# Patient Record
Sex: FEMALE | Race: WHITE | NOT HISPANIC OR LATINO | Employment: UNEMPLOYED | URBAN - METROPOLITAN AREA
[De-identification: names, ages, dates, MRNs, and addresses within clinical notes are randomized per-mention and may not be internally consistent; named-entity substitution may affect disease eponyms.]

---

## 2018-11-07 ENCOUNTER — HOSPITAL ENCOUNTER (OUTPATIENT)
Dept: RADIOLOGY | Facility: HOSPITAL | Age: 68
Discharge: HOME | End: 2018-11-07
Payer: MEDICAID

## 2018-11-07 DIAGNOSIS — J44.9 COPD (CHRONIC OBSTRUCTIVE PULMONARY DISEASE): ICD-10-CM

## 2018-11-07 DIAGNOSIS — J44.9 COPD (CHRONIC OBSTRUCTIVE PULMONARY DISEASE): Primary | ICD-10-CM

## 2018-11-07 DIAGNOSIS — Z12.39 SCREENING BREAST EXAMINATION: Primary | ICD-10-CM

## 2018-11-07 DIAGNOSIS — Z78.0 POSTMENOPAUSAL STATE: Primary | ICD-10-CM

## 2018-11-07 PROCEDURE — 71046 X-RAY EXAM CHEST 2 VIEWS: CPT | Mod: 26,,, | Performed by: RADIOLOGY

## 2018-11-07 PROCEDURE — 71046 X-RAY EXAM CHEST 2 VIEWS: CPT | Mod: TC,FY

## 2018-11-13 ENCOUNTER — TELEPHONE (OUTPATIENT)
Dept: NEUROLOGY | Facility: HOSPITAL | Age: 68
End: 2018-11-13

## 2018-11-13 NOTE — TELEPHONE ENCOUNTER
Patient returned call and stated she would call back when she was ready to schedule an appointment.

## 2018-12-11 ENCOUNTER — HOSPITAL ENCOUNTER (OUTPATIENT)
Dept: RADIOLOGY | Facility: HOSPITAL | Age: 68
Discharge: HOME | End: 2018-12-11
Payer: COMMERCIAL

## 2018-12-11 DIAGNOSIS — Z78.0 POSTMENOPAUSAL STATE: ICD-10-CM

## 2018-12-11 DIAGNOSIS — Z12.39 SCREENING BREAST EXAMINATION: ICD-10-CM

## 2018-12-11 PROCEDURE — 77067 SCR MAMMO BI INCL CAD: CPT | Mod: 26,,, | Performed by: RADIOLOGY

## 2018-12-11 PROCEDURE — 77063 BREAST TOMOSYNTHESIS BI: CPT | Mod: TC

## 2018-12-11 PROCEDURE — 77081 DXA BONE DENSITY APPENDICULR: CPT | Mod: 26,,, | Performed by: RADIOLOGY

## 2018-12-11 PROCEDURE — 77081 DXA BONE DENSITY APPENDICULR: CPT | Mod: TC

## 2018-12-11 PROCEDURE — 77063 BREAST TOMOSYNTHESIS BI: CPT | Mod: 26,,, | Performed by: RADIOLOGY

## 2018-12-13 ENCOUNTER — TELEPHONE (OUTPATIENT)
Dept: RADIOLOGY | Facility: HOSPITAL | Age: 68
End: 2018-12-13

## 2019-03-01 ENCOUNTER — TELEPHONE (OUTPATIENT)
Dept: OBSTETRICS AND GYNECOLOGY | Facility: CLINIC | Age: 69
End: 2019-03-01

## 2019-03-01 NOTE — TELEPHONE ENCOUNTER
Contacted pt regarding referral received on fax from pcp office to schedule an appt for abnormal paps. No answer, scheduled pt for ist available ./22 at 11am, lvm.

## 2019-03-01 NOTE — TELEPHONE ENCOUNTER
----- Message from Ct Sparks sent at 3/1/2019  9:53 AM CST -----  No. [phone]    Patient returned your call.

## 2019-03-20 ENCOUNTER — HOSPITAL ENCOUNTER (OUTPATIENT)
Dept: RADIOLOGY | Facility: HOSPITAL | Age: 69
Discharge: HOME | End: 2019-03-20
Attending: INTERNAL MEDICINE
Payer: MEDICARE

## 2019-03-20 DIAGNOSIS — M25.512 ACUTE PAIN OF BOTH SHOULDERS: ICD-10-CM

## 2019-03-20 DIAGNOSIS — M54.2 NECK PAIN: ICD-10-CM

## 2019-03-20 DIAGNOSIS — M25.511 ACUTE PAIN OF BOTH SHOULDERS: ICD-10-CM

## 2019-03-20 DIAGNOSIS — M54.2 NECK PAIN: Primary | ICD-10-CM

## 2019-03-20 PROCEDURE — 70360 X-RAY EXAM OF NECK: CPT | Mod: 26,,, | Performed by: RADIOLOGY

## 2019-03-20 PROCEDURE — 73030 X-RAY EXAM OF SHOULDER: CPT | Mod: TC,50,FY

## 2019-03-20 PROCEDURE — 73030 X-RAY EXAM OF SHOULDER: CPT | Mod: 26,50,, | Performed by: RADIOLOGY

## 2019-03-20 PROCEDURE — 70360 XR NECK SOFT TISSUE: ICD-10-PCS | Mod: 26,,, | Performed by: RADIOLOGY

## 2019-03-20 PROCEDURE — 70360 X-RAY EXAM OF NECK: CPT | Mod: TC,FY

## 2019-03-20 PROCEDURE — 73030 XR SHOULDER COMPLETE 2 OR MORE VIEWS BILATERAL: ICD-10-PCS | Mod: 26,50,, | Performed by: RADIOLOGY

## 2019-03-22 ENCOUNTER — OFFICE VISIT (OUTPATIENT)
Dept: OBSTETRICS AND GYNECOLOGY | Facility: CLINIC | Age: 69
End: 2019-03-22
Payer: MEDICARE

## 2019-03-22 VITALS
HEIGHT: 63 IN | SYSTOLIC BLOOD PRESSURE: 118 MMHG | WEIGHT: 147.5 LBS | DIASTOLIC BLOOD PRESSURE: 66 MMHG | BODY MASS INDEX: 26.13 KG/M2

## 2019-03-22 DIAGNOSIS — N81.4 CYSTOCELE WITH PROLAPSE: ICD-10-CM

## 2019-03-22 DIAGNOSIS — Z12.4 SCREENING FOR CERVICAL CANCER: Primary | ICD-10-CM

## 2019-03-22 DIAGNOSIS — N39.3 URINARY, INCONTINENCE, STRESS FEMALE: ICD-10-CM

## 2019-03-22 PROCEDURE — 99203 PR OFFICE/OUTPT VISIT, NEW, LEVL III, 30-44 MIN: ICD-10-PCS | Mod: S$GLB,,, | Performed by: OBSTETRICS & GYNECOLOGY

## 2019-03-22 PROCEDURE — 1101F PR PT FALLS ASSESS DOC 0-1 FALLS W/OUT INJ PAST YR: ICD-10-PCS | Mod: CPTII,S$GLB,, | Performed by: OBSTETRICS & GYNECOLOGY

## 2019-03-22 PROCEDURE — 1101F PT FALLS ASSESS-DOCD LE1/YR: CPT | Mod: CPTII,S$GLB,, | Performed by: OBSTETRICS & GYNECOLOGY

## 2019-03-22 PROCEDURE — 88175 CYTOPATH C/V AUTO FLUID REDO: CPT

## 2019-03-22 PROCEDURE — 99203 OFFICE O/P NEW LOW 30 MIN: CPT | Mod: S$GLB,,, | Performed by: OBSTETRICS & GYNECOLOGY

## 2019-03-22 PROCEDURE — 87086 URINE CULTURE/COLONY COUNT: CPT

## 2019-03-22 PROCEDURE — 99999 PR PBB SHADOW E&M-EST. PATIENT-LVL III: CPT | Mod: PBBFAC,,, | Performed by: OBSTETRICS & GYNECOLOGY

## 2019-03-22 PROCEDURE — 99999 PR PBB SHADOW E&M-EST. PATIENT-LVL III: ICD-10-PCS | Mod: PBBFAC,,, | Performed by: OBSTETRICS & GYNECOLOGY

## 2019-03-22 PROCEDURE — 87624 HPV HI-RISK TYP POOLED RSLT: CPT

## 2019-03-22 RX ORDER — CALCIUM CARBONATE 600 MG
1 TABLET ORAL DAILY
Refills: 3 | COMMUNITY
Start: 2019-02-27

## 2019-03-22 RX ORDER — METFORMIN HYDROCHLORIDE 500 MG/1
500 TABLET ORAL
Refills: 3 | COMMUNITY
Start: 2019-01-20

## 2019-03-22 RX ORDER — ERGOCALCIFEROL 1.25 MG/1
CAPSULE ORAL
Refills: 3 | COMMUNITY
Start: 2019-03-11

## 2019-03-22 RX ORDER — ASPIRIN 81 MG/1
81 TABLET ORAL DAILY
Refills: 2 | COMMUNITY
Start: 2018-12-19 | End: 2024-01-08

## 2019-03-22 RX ORDER — FENOFIBRATE 160 MG/1
TABLET ORAL
Refills: 0 | COMMUNITY
Start: 2018-12-19 | End: 2024-01-08

## 2019-03-22 NOTE — LETTER
March 22, 2019      Kathy Barth MD  [address]  [address]  [place] [address]           [place] [facility]  [address], [address]  [address]  [place] [address]  Phone: [phone]          Patient: Nia Ames   MR Number: [id number]   YOB: 1950   Date of Visit: 3/22/2019       Dear Dr. Kathy Barth:    Thank you for referring Nia Ames to me for evaluation. Attached you will find relevant portions of my assessment and plan of care.    If you have questions, please do not hesitate to call me. I look forward to following Nia Ames along with you.    Sincerely,    Sapphire Jones MD    Enclosure  CC:  No Recipients    If you would like to receive this communication electronically, please contact [email or web address] or [phone] to request more information on [employer] access.    For providers and/or their staff who would like to refer a patient to [facility], please contact us through our one-stop-shop provider referral line, [facility], at [phone].    If you feel you have received this communication in error or would no longer like to receive these types of communications, please e-mail [email or web address]

## 2019-03-22 NOTE — PROGRESS NOTES
GYNECOLOGY OFFICE NOTE    Reason for visit: abnormal pap    HPI: Pt is a 68 y.o.  female  who presents for evaluation of abnormal paps. Pt states she had a pap last year but a few years ago had an abnormal pap and had a colposcopy. Menarche- 12. Postmenopausal since [age]. She is not currently sexually active. She does not desire STI screening. She denies vaginal discharge.Last MMG 2018- negative. Reports stress urinary incontinence occasionally uses incontinence pads,  intermittent nocturia- 2 nights a week gets up twice a night. Denies dysuria, hesitancy,  x 4, largest baby 8lb 1oz, + operative delivery.     History reviewed. No pertinent past medical history.    Past Surgical History:   Procedure Laterality Date    spinal fusions      TONSILLECTOMY      TUBAL LIGATION         Family History   Problem Relation Age of Onset    Heart attack Father     Diabetes Mother        Social History     Tobacco Use    Smoking status: Light Tobacco Smoker   Substance Use Topics    Alcohol use: No     Frequency: Never    Drug use: No       OB History    Para Term  AB Living   6 4 4   2     SAB TAB Ectopic Multiple Live Births   2              # Outcome Date GA Lbr Pepe/2nd Weight Sex Delivery Anes PTL Lv   6 SAB            5 SAB            4 Term            3 Term            2 Term            1 Term                   Current Outpatient Medications   Medication Sig    aspirin (ECOTRIN) 81 MG EC tablet Take 81 mg by mouth once daily.    calcium carbonate (OS-PAL) 600 mg calcium (1,500 mg) Tab Take 1 tablet by mouth once daily.    fenofibrate 160 MG Tab TAKE 1 TABLET BY MOUTH DAILY TO LOWER TRIGLYERIDES    metFORMIN (GLUCOPHAGE) 500 MG tablet Take 500 mg by mouth 2 (two) times daily.    VITAMIN D2 50,000 unit capsule TAKE 1 CAPSULE WEEKLY FOR VITAMIN D REPLACMENT THERAPY     No current facility-administered medications for this visit.        Allergies: Codeine and Penicillins     BP  "118/66   Ht 5' 3" (1.6 m)   Wt 66.9 kg (147 lb 7.8 oz)   LMP  (LMP Unknown)   BMI 26.13 kg/m²     ROS:  GENERAL: Denies fever or chills.   SKIN: Denies rash or lesions.   HEAD: Denies head injury or headache.   CHEST: Denies chest pain or shortness of breath.   CARDIOVASCULAR: Denies palpitations or chest pain.   ABDOMEN: No abdominal pain, constipation, diarrhea, nausea, vomiting or rectal bleeding.   URINARY: No dysuria, hematuria, or burning on urination., reports  REPRODUCTIVE: See HPI.   BREASTS: see HPI  NEUROLOGIC: Denies syncope or weakness.     Physical Exam:  GENERAL: alert, appears stated age and cooperative  NEUROLOGIC: orientated to person, place and time, normal mood and affect   CHEST: Normal respiratory effort  NECK: normal appearance, no thyromegaly masses or tenderness  SKIN: no acne, striae, hirsutism  BREAST EXAM: not examined  ABDOMEN: abdomen is soft without significant tenderness, masses, organomegaly or guarding, no hernias noted  EXTERNAL GENITALIA:  normal general appearance  URETHRA: normal urethra, normal urethral meatus  VAGINA:  cystocele present, grade 3- reducible, nontender  CERVIX:  Normal  UTERUS:  exam limited by habitus  ADNEXA:  no masses    Diagnosis:  1. Screening for cervical cancer    2. Urinary, incontinence, stress female    3. Cystocele with prolapse        Plan:   1. Pap/hpv today. NABILA [facility]  2/3 POP with RAJESH. Referral to urogyn placed. Discussed pessary vs surgery    Orders Placed This Encounter    HPV High Risk Genotypes, PCR    Urine culture    Ambulatory referral to Urogynecology    Liquid-based pap smear, screening       Patient was counseled today on the new ACS guidelines for cervical cytology screening as well as the current recommendations for breast cancer screening. She was counseled to follow up with her PCP for other routine health maintenance.     Follow-up pending pap/hpv results.      Sapphire Jones MD  OB/GYN  Pager: " [phone]

## 2019-03-24 LAB — BACTERIA UR CULT: NORMAL

## 2019-03-25 ENCOUNTER — TELEPHONE (OUTPATIENT)
Dept: OBSTETRICS AND GYNECOLOGY | Facility: CLINIC | Age: 69
End: 2019-03-25

## 2019-03-28 LAB
HPV HR 12 DNA CVX QL NAA+PROBE: NEGATIVE
HPV16 AG SPEC QL: POSITIVE
HPV18 DNA SPEC QL NAA+PROBE: NEGATIVE

## 2019-04-01 ENCOUNTER — TELEPHONE (OUTPATIENT)
Dept: OBSTETRICS AND GYNECOLOGY | Facility: CLINIC | Age: 69
End: 2019-04-01

## 2019-04-01 NOTE — TELEPHONE ENCOUNTER
----- Message from Sapphire Jones MD sent at 3/31/2019 10:10 AM CDT -----  Negative pa +HPV16 needs colposcopy. Please assist with scheduling

## 2019-04-04 ENCOUNTER — TELEPHONE (OUTPATIENT)
Dept: NEUROLOGY | Facility: HOSPITAL | Age: 69
End: 2019-04-04

## 2019-04-04 NOTE — TELEPHONE ENCOUNTER
----- Message from Diandra Jones sent at 4/4/2019  9:42 AM CDT -----  Contact: Self/ [phone]  GI: Patient called in returning your call.     Please call.

## 2019-04-15 ENCOUNTER — PROCEDURE VISIT (OUTPATIENT)
Dept: OBSTETRICS AND GYNECOLOGY | Facility: CLINIC | Age: 69
End: 2019-04-15
Payer: MEDICARE

## 2019-04-15 ENCOUNTER — TELEPHONE (OUTPATIENT)
Dept: OBSTETRICS AND GYNECOLOGY | Facility: CLINIC | Age: 69
End: 2019-04-15

## 2019-04-15 VITALS
SYSTOLIC BLOOD PRESSURE: 122 MMHG | DIASTOLIC BLOOD PRESSURE: 78 MMHG | BODY MASS INDEX: 26.63 KG/M2 | WEIGHT: 150.38 LBS

## 2019-04-15 DIAGNOSIS — B97.7 HIGH RISK HPV INFECTION: ICD-10-CM

## 2019-04-15 PROCEDURE — 88305 TISSUE EXAM BY PATHOLOGIST: CPT | Mod: 26,,, | Performed by: PATHOLOGY

## 2019-04-15 PROCEDURE — 57454 BX/CURETT OF CERVIX W/SCOPE: CPT | Mod: S$GLB,,, | Performed by: OBSTETRICS & GYNECOLOGY

## 2019-04-15 PROCEDURE — 88305 TISSUE SPECIMEN TO PATHOLOGY, OBSTETRICS/GYNECOLOGY: ICD-10-PCS | Mod: 26,,, | Performed by: PATHOLOGY

## 2019-04-15 PROCEDURE — 57454 PR COLPOSC,CERVIX W/ADJ VAG,W/BX & CURRETAG: ICD-10-PCS | Mod: S$GLB,,, | Performed by: OBSTETRICS & GYNECOLOGY

## 2019-04-15 PROCEDURE — 88305 TISSUE EXAM BY PATHOLOGIST: CPT | Mod: 59 | Performed by: PATHOLOGY

## 2019-04-15 NOTE — TELEPHONE ENCOUNTER
Contacted pt regarding missed appt today at 9:45am. Pt states she is running late, she is in the parking lot on her way up.

## 2019-04-15 NOTE — PROCEDURES
Procedures   COLPOSCOPY:    Nia Ames is a 68 y.o. female   presents for colposcopy.  No LMP recorded (lmp unknown). Patient is postmenopausal..  Her most recent pap smear shows normal pap +HPV 16.  We discussed smoking cessation.     The abnormal test findings were discussed, as well as HPV infection, need for colposcopy and possible biopsies to determine the plan of care, treatments available, the minimal risk of bleeding and infection with colposcopy, and alternatives to colposcopy and she agrees to proceed.      UPT is negative    COLPOSCOPY EXAM:   TIME OUT PERFORMED.     acetowhite lesion(s) noted at 4-9 o'clock    The speculum was placed and cervix adequately visualized. Acetic acid applied to the cervix and findings as listed above. Biopsy was taken at 4 an 6 o'clock.  ECC was performed. Hemostasis was adequate with application of Monsel's solution. The speculum was removed.The patient did tolerate the procedure well.    All collected specimens sent to pathology for histologic analysis.    Post-colposcopy counseling:  The patient was instructed to manage post-colposcopy cramping with NSAIDs or Tylenol, or with a prescription per the medication card.  Avoid intercourse, douching, or tampons in the vagina for at least 2-3 days.  Expect a clumpy blackish discharge due to Monsel's solution application for several days.  Report heavy bleeding, worsening pain or pain that does not respond to above medications, or foul-smelling vaginal discharge. HPV vaccine recommended according to FDA age guidelines.  Importance of follow-up stressed.      Follow up pending results    Sapphire Jones MD, FACOG  OB/GYN  Pager: [phone]

## 2019-04-23 ENCOUNTER — TELEPHONE (OUTPATIENT)
Dept: OBSTETRICS AND GYNECOLOGY | Facility: HOSPITAL | Age: 69
End: 2019-04-23

## 2019-04-23 NOTE — TELEPHONE ENCOUNTER
Called to inform pt of colposcopy results- CIN1 on ecto and CIN2-3 on ecc. Recommend cone. No answer. Will send message for scheduling surgery once patient is aware.     Sapphire Jones MD, FACOG  OB/GYN  Pager: [phone]

## 2019-04-24 NOTE — TELEPHONE ENCOUNTER
----- Message from Sapphire Jones MD sent at 4/23/2019  5:32 PM CDT -----  CIN2-3 on ecc. Recommend cone. Called pt no answer. Will send message for scheduling once patient is aware.

## 2019-04-24 NOTE — TELEPHONE ENCOUNTER
Contacted pt and informed her her Colpo results came back as MAO 2-3 which means the cells are in the moderate-severe range of becoming cancer or spreading to other normal tissue. Informed pt the next step in treatment is having a procedure called a Cone. Explained to pt a cone is where Dr. Jones goes in and remove the abnormal tissue from the cervix, OP and takes nor more than a half an hr to complete. Pt states she would like to proceed with the procedure however she was told she if she surgery was needed she could have it at the same time as her bladder surgery. Pt scheduled for urogyn 6/25.

## 2019-04-25 ENCOUNTER — TELEPHONE (OUTPATIENT)
Dept: OBSTETRICS AND GYNECOLOGY | Facility: CLINIC | Age: 69
End: 2019-04-25

## 2019-04-25 DIAGNOSIS — Z01.818 PREOP EXAMINATION: Primary | ICD-10-CM

## 2019-04-25 NOTE — TELEPHONE ENCOUNTER
Pt does have the option to proceed with a hysterectomy for sure but there is a risk of having a underlying occult disease not identified with the colposcopy. I don't want her to wait to the end of June for a consultation though.     Sapphire Jones MD, FACOG  OB/GYN  Pager: [phone]

## 2019-04-25 NOTE — TELEPHONE ENCOUNTER
Returned call, informed pt of Dr. Jones's advice. Pt states she would like to just proceed with the Cone procedure first.

## 2019-04-25 NOTE — TELEPHONE ENCOUNTER
----- Message from Alicia Mortensen sent at 4/25/2019  4:06 PM CDT -----  Contact: self, [phone]  Patient called in returning your call. Please advise.

## 2019-05-29 ENCOUNTER — ANESTHESIA EVENT (OUTPATIENT)
Dept: SURGERY | Facility: HOSPITAL | Age: 69
End: 2019-05-29
Payer: MEDICARE

## 2019-05-29 ENCOUNTER — HOSPITAL ENCOUNTER (OUTPATIENT)
Dept: RADIOLOGY | Facility: HOSPITAL | Age: 69
Discharge: HOME | End: 2019-05-29
Attending: OBSTETRICS & GYNECOLOGY
Payer: MEDICARE

## 2019-05-29 ENCOUNTER — OFFICE VISIT (OUTPATIENT)
Dept: OBSTETRICS AND GYNECOLOGY | Facility: CLINIC | Age: 69
End: 2019-05-29
Payer: MEDICARE

## 2019-05-29 ENCOUNTER — HOSPITAL ENCOUNTER (OUTPATIENT)
Dept: PREADMISSION TESTING | Facility: HOSPITAL | Age: 69
Discharge: HOME | End: 2019-05-29
Attending: OBSTETRICS & GYNECOLOGY
Payer: MEDICARE

## 2019-05-29 VITALS
WEIGHT: 150 LBS | SYSTOLIC BLOOD PRESSURE: 128 MMHG | DIASTOLIC BLOOD PRESSURE: 78 MMHG | HEIGHT: 63 IN | BODY MASS INDEX: 26.58 KG/M2

## 2019-05-29 DIAGNOSIS — Z01.818 PREOP EXAMINATION: ICD-10-CM

## 2019-05-29 DIAGNOSIS — D06.9 CIN III (CERVICAL INTRAEPITHELIAL NEOPLASIA GRADE III) WITH SEVERE DYSPLASIA: Primary | ICD-10-CM

## 2019-05-29 PROCEDURE — 99213 PR OFFICE/OUTPT VISIT, EST, LEVL III, 20-29 MIN: ICD-10-PCS | Mod: S$GLB,,, | Performed by: OBSTETRICS & GYNECOLOGY

## 2019-05-29 PROCEDURE — 99999 PR PBB SHADOW E&M-EST. PATIENT-LVL III: CPT | Mod: PBBFAC,,, | Performed by: OBSTETRICS & GYNECOLOGY

## 2019-05-29 PROCEDURE — 1101F PR PT FALLS ASSESS DOC 0-1 FALLS W/OUT INJ PAST YR: ICD-10-PCS | Mod: CPTII,S$GLB,, | Performed by: OBSTETRICS & GYNECOLOGY

## 2019-05-29 PROCEDURE — 99213 OFFICE O/P EST LOW 20 MIN: CPT | Mod: S$GLB,,, | Performed by: OBSTETRICS & GYNECOLOGY

## 2019-05-29 PROCEDURE — 1101F PT FALLS ASSESS-DOCD LE1/YR: CPT | Mod: CPTII,S$GLB,, | Performed by: OBSTETRICS & GYNECOLOGY

## 2019-05-29 PROCEDURE — 71045 X-RAY EXAM CHEST 1 VIEW: CPT | Mod: 26,,, | Performed by: RADIOLOGY

## 2019-05-29 PROCEDURE — 71045 XR CHEST 1 VIEW PRE-OP: ICD-10-PCS | Mod: 26,,, | Performed by: RADIOLOGY

## 2019-05-29 PROCEDURE — 71045 X-RAY EXAM CHEST 1 VIEW: CPT | Mod: TC,FY

## 2019-05-29 PROCEDURE — 99999 PR PBB SHADOW E&M-EST. PATIENT-LVL III: ICD-10-PCS | Mod: PBBFAC,,, | Performed by: OBSTETRICS & GYNECOLOGY

## 2019-05-29 RX ORDER — LIDOCAINE HYDROCHLORIDE 10 MG/ML
1 INJECTION, SOLUTION EPIDURAL; INFILTRATION; INTRACAUDAL; PERINEURAL ONCE
Status: CANCELLED | OUTPATIENT
Start: 2019-05-29 | End: 2019-05-29

## 2019-05-29 RX ORDER — AMLODIPINE BESYLATE 10 MG/1
10 TABLET ORAL DAILY
COMMUNITY

## 2019-05-29 RX ORDER — SODIUM CHLORIDE, SODIUM LACTATE, POTASSIUM CHLORIDE, CALCIUM CHLORIDE 600; 310; 30; 20 MG/100ML; MG/100ML; MG/100ML; MG/100ML
INJECTION, SOLUTION INTRAVENOUS CONTINUOUS
Status: CANCELLED | OUTPATIENT
Start: 2019-05-29

## 2019-05-29 NOTE — H&P (VIEW-ONLY)
"C.NASIM Pre-op Exam      HPI : Nia Ames is a 68 y.o. 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 Surgery scheduled for 19. The pros, cons, risks, benefits and surgery were discussed.  The potential for injury to rectum, vagina, and bladder was discussed in addition to the risk of bleeding and infection. The possible need for a blood transfusion discussed.  The potential of recurrent disease and need for more surgery was discussed.  After the pros, cons risks and benefits were discussed the patient decided to have the surgery and she was consented for the procedures in usual fashion. All questions were answered and written informed consent has been obtained.     History reviewed. No pertinent past medical history.  Past Surgical History:   Procedure Laterality Date    spinal fusions      TONSILLECTOMY      TUBAL LIGATION       Family History   Problem Relation Age of Onset    Heart attack Father     Diabetes Mother      Social History     Tobacco Use    Smoking status: Heavy Tobacco Smoker     Packs/day: 0.50   Substance Use Topics    Alcohol use: No     Frequency: Never    Drug use: No     OB History    Para Term  AB Living   6 4 4   2     SAB TAB Ectopic Multiple Live Births   2              # Outcome Date GA Lbr Pepe/2nd Weight Sex Delivery Anes PTL Lv   6 SAB            5 SAB            4 Term            3 Term            2 Term            1 Term                /78   Ht 5' 3" (1.6 m)   Wt 68.1 kg (150 lb 0.4 oz)   LMP  (LMP Unknown)   BMI 26.58 kg/m²     ROS:  GENERAL: Feeling well overall.   SKIN: Denies rash or lesions.   HEAD: Denies head injury or headache.   NODES: Denies enlarged lymph nodes.   CHEST: Denies chest pain or shortness of breath.   CARDIOVASCULAR: Denies palpitations or left sided chest pain.   ABDOMEN: No abdominal pain, constipation, diarrhea, nausea, vomiting or rectal bleeding.   URINARY: No frequency, " dysuria, hematuria, or burning on urination.  REPRODUCTIVE: See HPI.   BREASTS: Denies pain, lumps, or nipple discharge.   HEMATOLOGIC: No easy bruisability.  MUSCULOSKELETAL: Denies joint pain or swelling.   NEUROLOGIC: Denies syncope or weakness.   PSYCHIATRIC: Denies depression, anxiety or mood swings.      Physical Exam:  GENERAL: alert, appears stated age and cooperative  NEUROLOGIC: orientated to person, place and time, normal mood and affect   CHEST: Normal respiratory effort  NECK: normal appearance  SKIN: no acne, striae, hirsutism  ABDOMEN: abdomen is soft without significant tenderness  PELVIC: deferred      ASSESSMENT & PLAN:  1. Preop examination      2. MAO III (cervical intraepithelial neoplasia grade III) with severe dysplasia    - Vital signs; Standing  - POCT glucose; Standing  - Notify physician if BS > 180 for hysterectomy patients; Standing  - Chlorhexidine (CHG) 2% Wipes; Standing  - Notify Physician/Vital Signs Parameters Urine output less than 0.5mL/kg/hr (with indwelling catheter) or 30 mL/hr (without indwelling catheter) or blood glucose greater than 200 mg/dL; Standing  - Notify physician; Standing  - Notify Physician - Potential Need of Opioid Reversal; Standing  - Chlorohexidine Gluconate Bath; Standing  - Full code; Standing  - Place in Outpatient; Standing  - Void on call to OR; Standing  - Diet NPO; Standing  - Place sequential compression device; Standing      I have discussed the risks, benefits, indications, and alternatives of the procedure in detail.  The patient verbalizes her understanding.  All questions answered.  Consents signed.  The patient agrees to proceed to proceed as planned: ckc. Hold metformin day of surgery and no longer taking asa x 1 week    Sapphire Jones MD  OB/GYN  Pager: [phone]

## 2019-05-29 NOTE — PROGRESS NOTES
"C.NASIM Pre-op Exam      HPI : Nia Ames is a 68 y.o. 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 Surgery scheduled for 19. The pros, cons, risks, benefits and surgery were discussed.  The potential for injury to rectum, vagina, and bladder was discussed in addition to the risk of bleeding and infection. The possible need for a blood transfusion discussed.  The potential of recurrent disease and need for more surgery was discussed.  After the pros, cons risks and benefits were discussed the patient decided to have the surgery and she was consented for the procedures in usual fashion. All questions were answered and written informed consent has been obtained.     History reviewed. No pertinent past medical history.  Past Surgical History:   Procedure Laterality Date    spinal fusions      TONSILLECTOMY      TUBAL LIGATION       Family History   Problem Relation Age of Onset    Heart attack Father     Diabetes Mother      Social History     Tobacco Use    Smoking status: Heavy Tobacco Smoker     Packs/day: 0.50   Substance Use Topics    Alcohol use: No     Frequency: Never    Drug use: No     OB History    Para Term  AB Living   6 4 4   2     SAB TAB Ectopic Multiple Live Births   2              # Outcome Date GA Lbr Pepe/2nd Weight Sex Delivery Anes PTL Lv   6 SAB            5 SAB            4 Term            3 Term            2 Term            1 Term                /78   Ht 5' 3" (1.6 m)   Wt 68.1 kg (150 lb 0.4 oz)   LMP  (LMP Unknown)   BMI 26.58 kg/m²     ROS:  GENERAL: Feeling well overall.   SKIN: Denies rash or lesions.   HEAD: Denies head injury or headache.   NODES: Denies enlarged lymph nodes.   CHEST: Denies chest pain or shortness of breath.   CARDIOVASCULAR: Denies palpitations or left sided chest pain.   ABDOMEN: No abdominal pain, constipation, diarrhea, nausea, vomiting or rectal bleeding.   URINARY: No frequency, " dysuria, hematuria, or burning on urination.  REPRODUCTIVE: See HPI.   BREASTS: Denies pain, lumps, or nipple discharge.   HEMATOLOGIC: No easy bruisability.  MUSCULOSKELETAL: Denies joint pain or swelling.   NEUROLOGIC: Denies syncope or weakness.   PSYCHIATRIC: Denies depression, anxiety or mood swings.      Physical Exam:  GENERAL: alert, appears stated age and cooperative  NEUROLOGIC: orientated to person, place and time, normal mood and affect   CHEST: Normal respiratory effort  NECK: normal appearance  SKIN: no acne, striae, hirsutism  ABDOMEN: abdomen is soft without significant tenderness  PELVIC: deferred      ASSESSMENT & PLAN:  1. Preop examination      2. MAO III (cervical intraepithelial neoplasia grade III) with severe dysplasia    - Vital signs; Standing  - POCT glucose; Standing  - Notify physician if BS > 180 for hysterectomy patients; Standing  - Chlorhexidine (CHG) 2% Wipes; Standing  - Notify Physician/Vital Signs Parameters Urine output less than 0.5mL/kg/hr (with indwelling catheter) or 30 mL/hr (without indwelling catheter) or blood glucose greater than 200 mg/dL; Standing  - Notify physician; Standing  - Notify Physician - Potential Need of Opioid Reversal; Standing  - Chlorohexidine Gluconate Bath; Standing  - Full code; Standing  - Place in Outpatient; Standing  - Void on call to OR; Standing  - Diet NPO; Standing  - Place sequential compression device; Standing      I have discussed the risks, benefits, indications, and alternatives of the procedure in detail.  The patient verbalizes her understanding.  All questions answered.  Consents signed.  The patient agrees to proceed to proceed as planned: ckc. Hold metformin day of surgery and no longer taking asa x 1 week    Sapphire Jones MD  OB/GYN  Pager: [phone]

## 2019-05-29 NOTE — PATIENT INSTRUCTIONS
Cone Biopsy  A cone biopsy is a quick outpatient surgery used to find and treat a problem in the cervix. Your health care provider may do a cone biopsy if 1 or more Pap tests and a colposcopy (microscope) exam showed abnormal cells on your cervix. A cone biopsy takes less than an hour, and youll be able to go home the same day. The most common type of cone biopsy is the loop electrosurgical excision procedure (LEEP). A wire with electric current is used to take the biopsy.     A cone-shaped piece of tissue is cut from the cervix. This removes the abnormal cells. The tissue that grows back is usually normal.    Preparing for a cone biopsy  If you will be given general anesthesia, do not eat or drink anything after midnight the night before surgery. Follow your health care providers instructions. Youll also need to have an adult friend or family member drive you home after the procedure. On the day of surgery, be sure to arrive at the hospital or surgery center in time to sign in and get ready for your procedure.  Your surgery  Here is what to expect during surgery:  · Youll be given anesthesia before your biopsy to keep you comfortable during surgery.  · Your health care provider then puts a thin metal instrument (speculum) into the vagina to spread it open. This allows your health care provider to see the cervix.  · Then a cone-shaped piece of tissue is removed from the cervix. The tissue is cut from the opening up into the canal. This may be done with a small knife.  · A special cream may be put on your cervix to control bleeding.   · The tissue that is removed is then sent to the lab. The lab studies the tissue and makes sure the abnormal cells have been cut away. New tissue grows back in the cervix in 4 to 6 weeks.  Recovery from a cone biopsy  After the procedure, you may have:  · A pink, liquid discharge  · Mild cramps  · A dark-colored discharge (from the cream used)  · Do not use tampons, do not douche,  and do not have sexual intercourse until your health care provider says it's OK.   Call your health care provider if you have:  · Heavy bleeding, or bleeding with clots  · Fever over 100.4°F (38°C) or chills  · Foul-smelling vaginal discharge        Your health care provider will discuss the risks and possible complications of cone biopsy with you. These include:  · Incomplete removal of abnormal tissue  · Severe bleeding  · Infection  · Weakening or scarring of the cervix that could lead to  birth   Date Last Reviewed: 5/10/2015  © 3104-6216 [employer]. [address] [place] [address]. All rights reserved. This information is not intended as a substitute for professional medical care. Always follow your healthcare professional's instructions.

## 2019-05-29 NOTE — ANESTHESIA PREPROCEDURE EVALUATION
05/29/2019  Nia Ames is a 68 y.o., female scheduled for cone biopsy on 6/4/19.    Past Surgical History:   Procedure Laterality Date    spinal fusions      TONSILLECTOMY      TUBAL LIGATION         Anesthesia Evaluation    I have reviewed the Patient Summary Reports.     I have reviewed the Medications.     Review of Systems  Anesthesia Hx:  No problems with previous Anesthesia  Denies Family Hx of Anesthesia complications.    Social:  Smoker, No Alcohol Use    Hematology/Oncology:  Hematology Normal        Cardiovascular:   Hypertension  Denies Angina. hyperlipidemia      Peripheral Arterial Disease    Pulmonary:  Pulmonary Normal    Renal/:  Kidney Function/Disease, Chronic Kidney Disease (CKD) , CKD Stage II (GFR 60-89)    Hepatic/GI:  Hepatic/GI Normal    Musculoskeletal:  Lumbar Spine Disorders, S/P Lumbar Fusion   Neurological:  Neurology Normal    Endocrine:   Diabetes, well controlled, type 2    Psych:  Psychiatric Normal           Physical Exam  General:  Well nourished    Airway/Jaw/Neck:  Airway Findings: Mouth Opening: Small, but > 3cm Tongue: Normal  General Airway Assessment: Adult  Mallampati: III  TM Distance: 4 - 6 cm      Dental:  Dental Findings: Periodontal disease, Severe   Chest/Lungs:  Chest/Lungs Findings: Clear to auscultation, Normal Respiratory Rate     Heart/Vascular:  Heart Findings: Rate: Normal  Rhythm: Regular Rhythm  Sounds: Normal        Mental Status:  Mental Status Findings:  Cooperative, Alert and Oriented         Anesthesia Plan  Type of Anesthesia, risks & benefits discussed:  Anesthesia Type:  general  Patient's Preference:   Intra-op Monitoring Plan: standard ASA monitors  Intra-op Monitoring Plan Comments:   Post Op Pain Control Plan: per primary service following discharge from PACU  Post Op Pain Control Plan Comments:   Induction:   IV  Beta Blocker:   Patient is not currently on a Beta-Blocker (No further documentation required).       Informed Consent: Patient understands risks and agrees with Anesthesia plan.  Questions answered. Anesthesia consent signed with patient.  ASA Score: 2     Day of Surgery Review of History & Physical:        Anesthesia Plan Notes: Anesthesia consent to be signed prior to procedure on 6/4/19          Ready For Surgery From Anesthesia Perspective.

## 2019-05-29 NOTE — PRE-PROCEDURE INSTRUCTIONS
[facility] - [facility] - [phone]    Allergies, medical, surgical, family and psychosocial histories reviewed with patient. Periop plan of care reviewed. Preop instructions given, including medications to take and to hold. Hibiclens soap and instructions on use given. Time allotted for questions to be addressed.  Patient verbalized understanding.

## 2019-05-29 NOTE — DISCHARGE INSTRUCTIONS
Your surgery is scheduled for 6/4/19.    Please report to Outpatient Surgery Intake Office on the 2nd FLOOR at 8:15a.m.          INSTRUCTIONS IMPORTANT!!!  ¨ Do not eat or drink after 12 midnight-including water. OK to brush teeth, no   gum, candy or mints!      ____  Proceed to [facility] on 5/29/19 for additional blood test.        ____  Do not wear makeup, including mascara.  ____  No powder, lotions or creams to surgical area.  ____  Please remove all jewelry, including piercings and leave at home.  ____  No money or valuables needed. Please leave at home.  ____  Please bring any documents given by your doctor.  ____  If going home the same day, arrange for a ride home. You will not be able to             drive if Anesthesia was used.  ____  Wear loose fitting clothing. Allow for dressings, bandages.  ____  Stop Aspirin, Ibuprofen, Motrin and Aleve at least 3-5 days before surgery, unless otherwise instructed by your doctor, or the nurse.   You MAY use Tylenol/acetaminophen until day of surgery.  ____  If you take diabetic medication, do not take am of surgery unless instructed by Doctor.  ____  Call MD for temperature above 101 degrees or any other signs of infection such as Urinary (bladder) infection, Upper respiratory infection, skin boils, etc.  ____ Stop taking any Fish Oil supplement or any Vitamins that contain Vitamin E at least 5 days prior to surgery.  ____ Do Not wear your contact lenses the day of your procedure.  You may wear your glasses.      ____Do not shave surgical site for 3 days prior to surgery.  ____ Practice Good hand washing before, during, and after procedure.      I have read or had read and explained to me, and understand the above information.  Additional comments or instructions:  For additional questions call [phone]      ANESTHESIA SIDE EFFECTS  -For the first 24 hours after surgery:  Do not drive, use heavy equipment, make important decisions, or drink  alcohol  -It is normal to feel sleepy for several hours.  Rest until you are more awake.  -Have someone stay with you, if needed.  They can watch for problems and help keep you safe.  -Some possible post anesthesia side effects include: nausea and vomiting, sore throat and hoarseness, sleepiness, and dizziness.          LEEP  LEEP stands for loop electrosurgical excision procedure. It is used to treat dysplasia (abnormal cell growth). A fine wire loop is used to remove a small amount of tissue from your cervix. This can be done in the healthcare providers office. You can go back to your routine the same day. Schedule your LEEP for a time when you are not menstruating.  During the procedure  Youll place your feet in stirrups. Your healthcare provider then inserts a speculum into your vagina. The speculum holds the walls of the vagina open to let the health care provider see the cervix:  · Your cervix is numbed with a local anesthetic.  · A mild vinegar or iodine solution may be applied to your cervix. This helps to highlight any dysplasia.  · Your healthcare provider may look through a colposcope. This helps him or her to get a close-up view of your cervix.  · The loop is inserted through your vagina and moved toward the cervix.  · The loop is used to remove a small piece of cervical tissue.  · A medicated solution may be applied to the cervix. This helps reduce bleeding.     The loop is inserted.       Tissue is removed from the cervix.       Medicine is applied to reduce bleeding.      After the procedure  You may have a watery, pink discharge and mild cramping following the procedure. Also, the solution used to decrease bleeding may cause a dark, vaginal discharge for a few days. Do not place anything in your vagina or have intercourse until your healthcare provider tells you it is OK. Your cervix should heal completely within a few weeks.  When to call your healthcare provider  Call your healthcare  provider right away if you have any of the following:  · Heavy bleeding or bleeding with clots  · Severe belly pain  · Fever   Date Last Reviewed: 12/1/2016 © 2000-2017 [employer]. [address] [place] [address], [place] [address]. All rights reserved. This information is not intended as a substitute for professional medical care. Always follow your healthcare professional's instructions.

## 2019-06-04 ENCOUNTER — HOSPITAL ENCOUNTER (OUTPATIENT)
Facility: HOSPITAL | Age: 69
Discharge: HOME | End: 2019-06-04
Attending: OBSTETRICS & GYNECOLOGY | Admitting: OBSTETRICS & GYNECOLOGY
Payer: MEDICARE

## 2019-06-04 ENCOUNTER — ANESTHESIA (OUTPATIENT)
Dept: SURGERY | Facility: HOSPITAL | Age: 69
End: 2019-06-04
Payer: MEDICARE

## 2019-06-04 VITALS
RESPIRATION RATE: 18 BRPM | WEIGHT: 150 LBS | DIASTOLIC BLOOD PRESSURE: 78 MMHG | OXYGEN SATURATION: 95 % | SYSTOLIC BLOOD PRESSURE: 132 MMHG | TEMPERATURE: 98 F | HEART RATE: 78 BPM | HEIGHT: 63 IN | BODY MASS INDEX: 26.58 KG/M2

## 2019-06-04 DIAGNOSIS — D06.9 CIN III (CERVICAL INTRAEPITHELIAL NEOPLASIA GRADE III) WITH SEVERE DYSPLASIA: ICD-10-CM

## 2019-06-04 LAB — POCT GLUCOSE: 120 MG/DL (ref 70–110)

## 2019-06-04 PROCEDURE — 71000015 HC POSTOP RECOV 1ST HR: Performed by: OBSTETRICS & GYNECOLOGY

## 2019-06-04 PROCEDURE — 00940 ANES VAGINAL PX NOS: CPT | Performed by: OBSTETRICS & GYNECOLOGY

## 2019-06-04 PROCEDURE — 88305 TISSUE SPECIMEN TO PATHOLOGY - SURGERY: ICD-10-PCS | Mod: 26,,, | Performed by: PATHOLOGY

## 2019-06-04 PROCEDURE — 57520 CONIZATION OF CERVIX: CPT | Mod: ,,, | Performed by: OBSTETRICS & GYNECOLOGY

## 2019-06-04 PROCEDURE — 63600175 PHARM REV CODE 636 W HCPCS: Performed by: STUDENT IN AN ORGANIZED HEALTH CARE EDUCATION/TRAINING PROGRAM

## 2019-06-04 PROCEDURE — 88307 TISSUE SPECIMEN TO PATHOLOGY - SURGERY: ICD-10-PCS | Mod: 26,,, | Performed by: PATHOLOGY

## 2019-06-04 PROCEDURE — 88305 TISSUE EXAM BY PATHOLOGIST: CPT | Performed by: PATHOLOGY

## 2019-06-04 PROCEDURE — 63600175 PHARM REV CODE 636 W HCPCS: Performed by: ANESTHESIOLOGY

## 2019-06-04 PROCEDURE — 25000003 PHARM REV CODE 250: Performed by: OBSTETRICS & GYNECOLOGY

## 2019-06-04 PROCEDURE — 25000003 PHARM REV CODE 250: Performed by: STUDENT IN AN ORGANIZED HEALTH CARE EDUCATION/TRAINING PROGRAM

## 2019-06-04 PROCEDURE — 57520 PR CONIZATION CERVIX,KNIFE/LASER: ICD-10-PCS | Mod: ,,, | Performed by: OBSTETRICS & GYNECOLOGY

## 2019-06-04 PROCEDURE — 88307 TISSUE EXAM BY PATHOLOGIST: CPT | Mod: 26,,, | Performed by: PATHOLOGY

## 2019-06-04 PROCEDURE — 36000707: Performed by: OBSTETRICS & GYNECOLOGY

## 2019-06-04 PROCEDURE — 88305 TISSUE EXAM BY PATHOLOGIST: CPT | Mod: 26,,, | Performed by: PATHOLOGY

## 2019-06-04 PROCEDURE — 36000706: Performed by: OBSTETRICS & GYNECOLOGY

## 2019-06-04 PROCEDURE — 25000003 PHARM REV CODE 250: Performed by: ANESTHESIOLOGY

## 2019-06-04 PROCEDURE — 88307 TISSUE EXAM BY PATHOLOGIST: CPT | Performed by: PATHOLOGY

## 2019-06-04 PROCEDURE — 71000033 HC RECOVERY, INTIAL HOUR: Performed by: OBSTETRICS & GYNECOLOGY

## 2019-06-04 PROCEDURE — 37000008 HC ANESTHESIA 1ST 15 MINUTES: Performed by: OBSTETRICS & GYNECOLOGY

## 2019-06-04 PROCEDURE — 25000242 PHARM REV CODE 250 ALT 637 W/ HCPCS: Performed by: STUDENT IN AN ORGANIZED HEALTH CARE EDUCATION/TRAINING PROGRAM

## 2019-06-04 PROCEDURE — 37000009 HC ANESTHESIA EA ADD 15 MINS: Performed by: OBSTETRICS & GYNECOLOGY

## 2019-06-04 RX ORDER — ALBUTEROL SULFATE 90 UG/1
AEROSOL, METERED RESPIRATORY (INHALATION)
Status: DISCONTINUED | OUTPATIENT
Start: 2019-06-04 | End: 2019-06-04

## 2019-06-04 RX ORDER — IBUPROFEN 600 MG/1
600 TABLET ORAL EVERY 6 HOURS PRN
Qty: 30 TABLET | Refills: 0 | Status: SHIPPED | OUTPATIENT
Start: 2019-06-04

## 2019-06-04 RX ORDER — ONDANSETRON 2 MG/ML
INJECTION INTRAMUSCULAR; INTRAVENOUS
Status: DISCONTINUED | OUTPATIENT
Start: 2019-06-04 | End: 2019-06-04

## 2019-06-04 RX ORDER — SODIUM CHLORIDE, SODIUM LACTATE, POTASSIUM CHLORIDE, CALCIUM CHLORIDE 600; 310; 30; 20 MG/100ML; MG/100ML; MG/100ML; MG/100ML
INJECTION, SOLUTION INTRAVENOUS CONTINUOUS PRN
Status: DISCONTINUED | OUTPATIENT
Start: 2019-06-04 | End: 2019-06-04

## 2019-06-04 RX ORDER — HYDROMORPHONE HYDROCHLORIDE 2 MG/ML
0.2 INJECTION, SOLUTION INTRAMUSCULAR; INTRAVENOUS; SUBCUTANEOUS EVERY 5 MIN PRN
Status: DISCONTINUED | OUTPATIENT
Start: 2019-06-04 | End: 2019-06-04 | Stop reason: HOSPADM

## 2019-06-04 RX ORDER — LIDOCAINE HCL/PF 100 MG/5ML
SYRINGE (ML) INTRAVENOUS
Status: DISCONTINUED | OUTPATIENT
Start: 2019-06-04 | End: 2019-06-04

## 2019-06-04 RX ORDER — OXYCODONE AND ACETAMINOPHEN 5; 325 MG/1; MG/1
1 TABLET ORAL
Status: DISCONTINUED | OUTPATIENT
Start: 2019-06-04 | End: 2019-06-04 | Stop reason: HOSPADM

## 2019-06-04 RX ORDER — ONDANSETRON 8 MG/1
8 TABLET, ORALLY DISINTEGRATING ORAL EVERY 8 HOURS PRN
Status: CANCELLED | OUTPATIENT
Start: 2019-06-04

## 2019-06-04 RX ORDER — VASOPRESSIN 20 [USP'U]/ML
INJECTION, SOLUTION INTRAMUSCULAR; SUBCUTANEOUS
Status: DISCONTINUED | OUTPATIENT
Start: 2019-06-04 | End: 2019-06-04 | Stop reason: HOSPADM

## 2019-06-04 RX ORDER — HYDROMORPHONE HYDROCHLORIDE 2 MG/ML
0.5 INJECTION, SOLUTION INTRAMUSCULAR; INTRAVENOUS; SUBCUTANEOUS EVERY 5 MIN PRN
Status: DISCONTINUED | OUTPATIENT
Start: 2019-06-04 | End: 2019-06-04 | Stop reason: HOSPADM

## 2019-06-04 RX ORDER — HYDROCODONE BITARTRATE AND ACETAMINOPHEN 10; 325 MG/1; MG/1
1 TABLET ORAL EVERY 4 HOURS PRN
Status: DISCONTINUED | OUTPATIENT
Start: 2019-06-04 | End: 2019-06-04 | Stop reason: HOSPADM

## 2019-06-04 RX ORDER — MIDAZOLAM HYDROCHLORIDE 1 MG/ML
INJECTION, SOLUTION INTRAMUSCULAR; INTRAVENOUS
Status: DISCONTINUED | OUTPATIENT
Start: 2019-06-04 | End: 2019-06-04

## 2019-06-04 RX ORDER — IBUPROFEN 600 MG/1
600 TABLET ORAL EVERY 6 HOURS PRN
Status: CANCELLED | OUTPATIENT
Start: 2019-06-04

## 2019-06-04 RX ORDER — SODIUM CHLORIDE, SODIUM LACTATE, POTASSIUM CHLORIDE, CALCIUM CHLORIDE 600; 310; 30; 20 MG/100ML; MG/100ML; MG/100ML; MG/100ML
INJECTION, SOLUTION INTRAVENOUS CONTINUOUS
Status: DISCONTINUED | OUTPATIENT
Start: 2019-06-04 | End: 2019-06-04 | Stop reason: HOSPADM

## 2019-06-04 RX ORDER — FENTANYL CITRATE 50 UG/ML
INJECTION, SOLUTION INTRAMUSCULAR; INTRAVENOUS
Status: DISCONTINUED | OUTPATIENT
Start: 2019-06-04 | End: 2019-06-04

## 2019-06-04 RX ORDER — DIPHENHYDRAMINE HYDROCHLORIDE 50 MG/ML
25 INJECTION INTRAMUSCULAR; INTRAVENOUS EVERY 6 HOURS PRN
Status: DISCONTINUED | OUTPATIENT
Start: 2019-06-04 | End: 2019-06-04 | Stop reason: HOSPADM

## 2019-06-04 RX ORDER — HYDROCODONE BITARTRATE AND ACETAMINOPHEN 5; 325 MG/1; MG/1
1 TABLET ORAL EVERY 12 HOURS PRN
Qty: 10 TABLET | Refills: 0 | Status: SHIPPED | OUTPATIENT
Start: 2019-06-04 | End: 2024-01-08

## 2019-06-04 RX ORDER — HYDROCODONE BITARTRATE AND ACETAMINOPHEN 5; 325 MG/1; MG/1
1 TABLET ORAL EVERY 4 HOURS PRN
Status: CANCELLED | OUTPATIENT
Start: 2019-06-04

## 2019-06-04 RX ORDER — DIPHENHYDRAMINE HYDROCHLORIDE 50 MG/ML
25 INJECTION INTRAMUSCULAR; INTRAVENOUS EVERY 4 HOURS PRN
Status: CANCELLED | OUTPATIENT
Start: 2019-06-04

## 2019-06-04 RX ORDER — DIPHENHYDRAMINE HCL 25 MG
25 CAPSULE ORAL EVERY 4 HOURS PRN
Status: CANCELLED | OUTPATIENT
Start: 2019-06-04

## 2019-06-04 RX ORDER — LIDOCAINE HYDROCHLORIDE 10 MG/ML
1 INJECTION, SOLUTION EPIDURAL; INFILTRATION; INTRACAUDAL; PERINEURAL ONCE
Status: DISCONTINUED | OUTPATIENT
Start: 2019-06-04 | End: 2019-06-04 | Stop reason: HOSPADM

## 2019-06-04 RX ORDER — DEXAMETHASONE SODIUM PHOSPHATE 4 MG/ML
INJECTION, SOLUTION INTRA-ARTICULAR; INTRALESIONAL; INTRAMUSCULAR; INTRAVENOUS; SOFT TISSUE
Status: DISCONTINUED | OUTPATIENT
Start: 2019-06-04 | End: 2019-06-04

## 2019-06-04 RX ORDER — PROPOFOL 10 MG/ML
VIAL (ML) INTRAVENOUS
Status: DISCONTINUED | OUTPATIENT
Start: 2019-06-04 | End: 2019-06-04

## 2019-06-04 RX ADMIN — OXYCODONE AND ACETAMINOPHEN 1 TABLET: 5; 325 TABLET ORAL at 11:06

## 2019-06-04 RX ADMIN — FENTANYL CITRATE 25 MCG: 50 INJECTION, SOLUTION INTRAMUSCULAR; INTRAVENOUS at 10:06

## 2019-06-04 RX ADMIN — ONDANSETRON 4 MG: 2 INJECTION, SOLUTION INTRAMUSCULAR; INTRAVENOUS at 10:06

## 2019-06-04 RX ADMIN — HYDROMORPHONE HYDROCHLORIDE 0.5 MG: 2 INJECTION, SOLUTION INTRAMUSCULAR; INTRAVENOUS; SUBCUTANEOUS at 10:06

## 2019-06-04 RX ADMIN — ALBUTEROL SULFATE 2 PUFF: 90 AEROSOL, METERED RESPIRATORY (INHALATION) at 09:06

## 2019-06-04 RX ADMIN — FENTANYL CITRATE 50 MCG: 50 INJECTION, SOLUTION INTRAMUSCULAR; INTRAVENOUS at 09:06

## 2019-06-04 RX ADMIN — SODIUM CHLORIDE, SODIUM LACTATE, POTASSIUM CHLORIDE, AND CALCIUM CHLORIDE: .6; .31; .03; .02 INJECTION, SOLUTION INTRAVENOUS at 09:06

## 2019-06-04 RX ADMIN — LIDOCAINE HYDROCHLORIDE 60 MG: 20 INJECTION, SOLUTION INTRAVENOUS at 09:06

## 2019-06-04 RX ADMIN — PROPOFOL 125 MG: 10 INJECTION, EMULSION INTRAVENOUS at 09:06

## 2019-06-04 RX ADMIN — HYDROMORPHONE HYDROCHLORIDE 0.5 MG: 2 INJECTION, SOLUTION INTRAMUSCULAR; INTRAVENOUS; SUBCUTANEOUS at 11:06

## 2019-06-04 RX ADMIN — MIDAZOLAM 2 MG: 1 INJECTION INTRAMUSCULAR; INTRAVENOUS at 09:06

## 2019-06-04 RX ADMIN — DEXAMETHASONE SODIUM PHOSPHATE 4 MG: 4 INJECTION, SOLUTION INTRAMUSCULAR; INTRAVENOUS at 09:06

## 2019-06-04 NOTE — INTERVAL H&P NOTE
The patient has been seen and the H&P has been reviewed: No interval changes since last clinic visit.  Proceed to OR for scheduled procedure: [facility]    Sapphire Jones MD  OB/GYN  Pager: [phone]              Active Hospital Problems    Diagnosis  POA    *MAO III (cervical intraepithelial neoplasia grade III) with severe dysplasia [D06.9]  Yes      Resolved Hospital Problems   No resolved problems to display.

## 2019-06-04 NOTE — DISCHARGE INSTRUCTIONS
ANESTHESIA  -For the first 24 hours after surgery:  Do not drive, use heavy equipment, make important decisions, or drink alcohol  -It is normal to feel sleepy for several hours.  Rest until you are more awake.  -Have someone stay with you, if needed.  They can watch for problems and help keep you safe.  -Some possible post anesthesia side effects include: nausea and vomiting, sore throat and hoarseness, sleepiness, and dizziness.    PAIN  -If you have pain after surgery, pain medicine will help you feel better.  Take it as directed, before pain becomes severe.  Most pain relievers taken by mouth need at least 20-30 minutes to start working.  -Do not drive or drink alcohol while taking pain medicine.  -Pain medication can upset your stomach.  Taking them with a little food may help.  -Other ways to help control pain: elevation, ice, and relaxation  -Call your surgeon if still having unmanageable pain an hour after taking pain medicine.  -Pain medicine can cause constipation.  Taking an over-the counter stool softener while on prescription pain medicine and drinking plenty of fluids can prevent this side effect.  -Call your surgeon if you have severe side effects like: breathing problems, trouble waking up, dizziness, confusion, or severe constipation.    NAUSEA  -Some people have nausea after surgery.  This is often because of anesthesia, pain, pain medicine, or the stress of surgery.  -Do not push yourself to eat.  Start off with clear liquids and soup.  Slowly move to solid foods.  Don't eat fatty, rich, spicy foods at first.  Eat smaller amounts.  -If you develop persistent nausea and vomiting please notify your surgeon immediately.    BLEEDING  -Different types of surgery require different types of care and dressing changes.  It is important to follow all instructions and advice from your surgeon.  Change dressing as directed.  Call your surgeon for any concerns regarding postop bleeding.    SIGNS OF  INFECTION  -Signs of infection include: fever, swelling, drainage, and redness  -Notify your surgeon if you have a fever of 100.4 F (38.0 C) or higher.  -Notify your surgeon if you notice redness, swelling, increased pain, pus, or a foul smell at the incision site.        ***  BATHING:                   You may shower after your dressing is removed, but no tub baths, hot tubs, saunas or swimming until you see the doctor.    DRESSING:  ? Remove your bandage ________________. If there are skin tapes over the incision, leave them in place. They will start to come off in 5-7 days.  ACTIVITY LEVEL: If you have received sedation or an anesthetic, you may feel sleepy for   several hours. Rest until you are more awake. Gradually resume your normal activities  ? No heavy lifting or straining, nothing over 10 lbs., like a gallon of milk.  ? Pelvic rest- no sex, tampons or douching until follow up or instructed by doctor.  DIET:  You may resume your home diet. If nausea is present, increase your diet gradually with fluids and bland foods.    Medications:  Pain medication should be taken only if needed and as directed. If antibiotics are prescribed, the medication should be taken until completed. You will be given an updated list of you medications.  ? No driving, alcoholic beverages or signing legal documents for next 24 hours or while taking pain medication    CALL THE DOCTOR:    For any obvious bleeding (some dried blood over the incision is normal).      Redness, swelling, foul smell around incision or fever over 101.   Shortness of breath, Coughing up Bloody Sputum or Pains or Swelling in your calves.   Persistent pain or nausea not relieved by medication.   If vaginal bleeding is in excess of a normal period.   Problems urinating    If any unusual problems or difficulties occur contact your doctor. If you cannot contact your doctor but feel your signs and symptoms warrant a physicians attention return to the  emergency room.          Acetaminophen; Hydrocodone tablets or capsules  What is this medicine?  ACETAMINOPHEN; HYDROCODONE (a set a GE neftali fen; shara droe KOE done) is a pain reliever. It is used to treat moderate to severe pain.  How should I use this medicine?  Take this medicine by mouth with a glass of water. Follow the directions on the prescription label. You can take it with or without food. If it upsets your stomach, take it with food. Do not take your medicine more often than directed.  A special MedGuide will be given to you by the pharmacist with each prescription and refill. Be sure to read this information carefully each time.  Talk to your pediatrician regarding the use of this medicine in children. Special care may be needed.  What side effects may I notice from receiving this medicine?  Side effects that you should report to your doctor or health care professional as soon as possible:  · allergic reactions like skin rash, itching or hives, swelling of the face, lips, or tongue  · breathing problems  · confusion  · redness, blistering, peeling or loosening of the skin, including inside the mouth  · signs and symptoms of low blood pressure like dizziness; feeling faint or lightheaded, falls; unusually weak or tired  · trouble passing urine or change in the amount of urine  · yellowing of the eyes or skin  Side effects that usually do not require medical attention (report to your doctor or health care professional if they continue or are bothersome):  · constipation  · dry mouth  · nausea, vomiting  · tiredness  What may interact with this medicine?  This medicine may interact with the following medications:  · alcohol  · antiviral medicines for HIV or AIDS  · atropine  · antihistamines for allergy, cough and cold  · certain antibiotics like erythromycin, clarithromycin  · certain medicines for anxiety or sleep  · certain medicines for bladder problems like oxybutynin, tolterodine  · certain medicines  for depression like amitriptyline, fluoxetine, sertraline  · certain medicines for fungal infections like ketoconazole and itraconazole  · certain medicines for Parkinson's disease like benztropine, trihexyphenidyl  · certain medicines for seizures like carbamazepine, phenobarbital, phenytoin, primidone  · certain medicines for stomach problems like dicyclomine, hyoscyamine  · certain medicines for travel sickness like scopolamine  · general anesthetics like halothane, isoflurane, methoxyflurane, propofol  · ipratropium  · local anesthetics like lidocaine, pramoxine, tetracaine  · MAOIs like Carbex, Eldepryl, Marplan, Nardil, and Parnate  · medicines that relax muscles for surgery  · other medicines with acetaminophen  · other narcotic medicines for pain or cough  · phenothiazines like chlorpromazine, mesoridazine, prochlorperazine, thioridazine  · rifampin  What if I miss a dose?  If you miss a dose, take it as soon as you can. If it is almost time for your next dose, take only that dose. Do not take double or extra doses.  Where should I keep my medicine?  Keep out of the reach of children. This medicine can be abused. Keep your medicine in a safe place to protect it from theft. Do not share this medicine with anyone. Selling or giving away this medicine is dangerous and against the law.  This medicine may cause accidental overdose and death if it taken by other adults, children, or pets. Mix any unused medicine with a substance like cat litter or coffee grounds. Then throw the medicine away in a sealed container like a sealed bag or a coffee can with a lid. Do not use the medicine after the expiration date.  Store at room temperature between 15 and 30 degrees C (59 and 86 degrees F).  What should I tell my health care provider before I take this medicine?  They need to know if you have any of these conditions:  · brain tumor  · Crohn's disease, inflammatory bowel disease, or ulcerative colitis  · drug abuse or  addiction  · head injury  · heart or circulation problems  · if you often drink alcohol  · kidney disease or problems going to the bathroom  · liver disease  · lung disease, asthma, or breathing problems  · an unusual or allergic reaction to acetaminophen, hydrocodone, other opioid analgesics, other medicines, foods, dyes, or preservatives  · pregnant or trying to get pregnant  · breast-feeding  What should I watch for while using this medicine?  Tell your doctor or health care professional if your pain does not go away, if it gets worse, or if you have new or a different type of pain. You may develop tolerance to the medicine. Tolerance means that you will need a higher dose of the medicine for pain relief. Tolerance is normal and is expected if you take the medicine for a long time.  Do not suddenly stop taking your medicine because you may develop a severe reaction. Your body becomes used to the medicine. This does NOT mean you are addicted. Addiction is a behavior related to getting and using a drug for a non-medical reason. If you have pain, you have a medical reason to take pain medicine. Your doctor will tell you how much medicine to take. If your doctor wants you to stop the medicine, the dose will be slowly lowered over time to avoid any side effects.  There are different types of narcotic medicines (opiates). If you take more than one type at the same time or if you are taking another medicine that also causes drowsiness, you may have more side effects. Give your health care provider a list of all medicines you use. Your doctor will tell you how much medicine to take. Do not take more medicine than directed. Call emergency for help if you have problems breathing or unusual sleepiness.  Do not take other medicines that contain acetaminophen with this medicine. Always read labels carefully. If you have questions, ask your doctor or pharmacist.  If you take too much acetaminophen get medical help right away.  Too much acetaminophen can be very dangerous and cause liver damage. Even if you do not have symptoms, it is important to get help right away.  You may get drowsy or dizzy. Do not drive, use machinery, or do anything that needs mental alertness until you know how this medicine affects you. Do not stand or sit up quickly, especially if you are an older patient. This reduces the risk of dizzy or fainting spells. Alcohol may interfere with the effect of this medicine. Avoid alcoholic drinks.  The medicine will cause constipation. Try to have a bowel movement at least every 2 to 3 days. If you do not have a bowel movement for 3 days, call your doctor or health care professional.  Your mouth may get dry. Chewing sugarless gum or sucking hard candy, and drinking plenty of water may help. Contact your doctor if the problem does not go away or is severe.  NOTE:This sheet is a summary. It may not cover all possible information. If you have questions about this medicine, talk to your doctor, pharmacist, or health care provider. Copyright© 2017 [employer]

## 2019-06-04 NOTE — TRANSFER OF CARE
"Anesthesia Transfer of Care Note    Patient: Nia Ames    Procedure(s) Performed: Procedure(s) (LRB):  CONE BIOPSY, CERVIX, USING COLD KNIFE (N/A)    Patient location: PACU    Anesthesia Type: general    Transport from OR: Transported from OR on 6-10 L/min O2 by face mask with adequate spontaneous ventilation    Post pain: adequate analgesia    Post assessment: no apparent anesthetic complications    Post vital signs: stable    Level of consciousness: awake, alert and oriented    Nausea/Vomiting: no nausea/vomiting    Complications: none          Last vitals:   Visit Vitals  BP (!) 143/81   Pulse 83   Temp 36.9 °C (98.4 °F) (Skin)   Resp 18   Ht 5' 3" (1.6 m)   Wt 68 kg (150 lb)   LMP  (LMP Unknown)   SpO2 98%   Breastfeeding? No   BMI 26.57 kg/m²     "

## 2019-06-04 NOTE — OP NOTE
OPERATIVE REPORT    DATE OF PROCEDURE: 06/04/2019    PREOPERATIVE DIAGNOSIS:   1. Cervical intraepithelial neoplasia grade 3    POSTOPERATIVE DIAGNOSIS:   1. Same    PROCEDURE:   1. Cone knife conization    SURGEON: Sapphire Jones MD    ASSISTANT: EUGENIO Mai    ESTIMATED BLOOD LOSS: 25 mL     INTRAVENOUS FLUIDS: 300 mL     URINE OUTPUT: 50 mL    SPECIMEN: cone specimen tagged at 12 o'clock and post cone ECC    OPERATIVE FINDINGS: On exam under anesthesia- cervix with evidence of decreased uptake of Lugols solution at 11-12 o'clock  PopQ:Aa at 0 and Ap -1, C at -3    PROCEDURE IN DETAIL:   After verifying consents, the patient was taken to the operating room where she was prepped and draped in the normal sterile fashion after general was found to be adequate.  The patient was placed in the dorsal lithotomy position with her legs in the yellowfin stirrups. In an out catherization was performed. A weighted speculum was then placed in the posterior aspect of the vagina and a right angle retractor was placed in the anterior aspect of the vagina.  Once the cervix was well visualized, a single tooth tenaculum was placed at the anterior aspect of the cervix.  Stage 2 prolapse as outlined above.Stay sutures of 2-0 chromic gut were placed at 3 O'clock and 9 O'clock. Vasopressin diluted to 20 in 100 cc of normal saline was injected circumferentially for 10 cc.  Lugols solution was then applied to the cervix and surrounding vaginal tissue.  An 11 blade scalpel was used in a circumferential fashion to remove a cone shaped specimen starting at the posterior aspect of the cervix.  The specimen was tagged at and handed off to be sent to pathology.  An endocervical curettage was then performed and also sent to pathology.    The Bovie was used to attain hemostasis along with monsels and running stitch of 2-0Chromic.  The stay sutures were cut and the single tooth tenaculum was removed. The area was then re-assessed for  hemostasis and noted to be dry. Retractors were removed.   Sponge, lap and needle counts correct x 2, the patient was taken to recovery in stable condition.    Sapphire Jones MD, FACOG  OB/GYN  Pager: [phone]

## 2019-06-04 NOTE — ANESTHESIA POSTPROCEDURE EVALUATION
Anesthesia Post Evaluation    Patient: Nia Ames    Procedure(s) Performed: Procedure(s) (LRB):  CONE BIOPSY, CERVIX, USING COLD KNIFE (N/A)    Final Anesthesia Type: general  Patient location during evaluation: PACU  Patient participation: Yes- Able to Participate  Level of consciousness: awake and alert  Post-procedure vital signs: reviewed and stable  Pain management: adequate  Airway patency: patent  PONV status at discharge: No PONV  Anesthetic complications: no      Cardiovascular status: blood pressure returned to baseline  Respiratory status: unassisted  Hydration status: euvolemic  Follow-up not needed.          Vitals Value Taken Time   /78 6/4/2019 11:54 AM   Temp 36.7 °C (98.1 °F) 6/4/2019 11:54 AM   Pulse 78 6/4/2019 11:54 AM   Resp 18 6/4/2019 11:54 AM   SpO2 95 % 6/4/2019 11:54 AM         Event Time     Out of Recovery 11:16:00          Pain/Callum Score: Pain Rating Prior to Med Admin: 6 (6/4/2019 11:02 AM)  Pain Rating Post Med Admin: 4 (6/4/2019 11:10 AM)  Callum Score: 10 (6/4/2019 11:54 AM)

## 2019-06-04 NOTE — OP NOTE
[facility]  Brief Operative Note     SUMMARY     Surgery Date: 6/4/2019     Surgeon(s) and Role:     * Sapphire Jones MD - Primary    Assisting Surgeon: Mary Moss LPN    Pre-op Diagnosis:   Severe dysplasia of cervix (MAO III) [D06.9]    Post-op Diagnosis:  Post-Op Diagnosis Codes:   Severe dysplasia of cervix (MAO III) [D06.9]    Procedure(s) (LRB):  CONE BIOPSY, CERVIX, USING COLD KNIFE (N/A)    Anesthesia: General    FINDINGS/KEY COMPONENTS: decreased uptake of lugols at 11-12 oclock, POPQ with Aa/Ap at -1,  TVL- 3 cm    ESTIMATED BLOOD LOSS: 25cc    COMPLICATIONS: none    Specimens:   Specimen (12h ago, onward)    Start     Ordered    06/04/19 1003  Specimen to Pathology - Surgery  Once     Comments:  Pre-op Diagnosis: Moderate dysplasia of cervix (MAO II) [N87.1]Severe dysplasia of cervix (MAO III) [D06.9]Post-op Diagnosis: PendingProcedure(s):CONE BIOPSY, CERVIX, USING COLD KNIFE Number of specimens: 2Name of specimens: 1. [facility] Specimen tagged at 122. Post [facility] ECT     Start Status     06/04/19 1003 Collected (06/04/19 1024) Order ID: [id number]       06/04/19 1015          Discharge Note    SUMMARY     Admit Date: 6/4/2019    Discharge Date:  06/04/2019     Hospital Course (synopsis of major diagnoses, care, treatment, and services provided during the course of the hospital stay): Patient was admitted for an outpatient procedure (ckc secondary to CIN3) and tolerated the procedure well with no complications. Please see operative report for further details. Following the procedure the patient was awakened from anesthesia and transferred to the recovery area in stable condition. Patient was discharged to home once ambulating, voiding, tolerating clear liquids and pain well controlled. Patient given routine post-op instructions and prescriptions for pain medication to take as needed. Patient instructed to follow up with me in 6 weeks for postoperative appointment.      Final Diagnosis:  Post-Op Diagnosis Codes:     * Moderate dysplasia of cervix (MAO II) [N87.1]     * Severe dysplasia of cervix (MAO III) [D06.9]    Disposition: Home or Self Care    Follow Up/Patient Instructions:     Medications:  Reconciled Home Medications:      Medication List      START taking these medications    HYDROcodone-acetaminophen 5-325 mg per tablet  Commonly known as:  NORCO  Take 1 tablet by mouth every 12 (twelve) hours as needed for Pain.     ibuprofen 600 MG tablet  Commonly known as:  ADVIL,MOTRIN  Take 1 tablet (600 mg total) by mouth every 6 (six) hours as needed for Pain.        CONTINUE taking these medications    amLODIPine 10 MG tablet  Commonly known as:  NORVASC  Take 10 mg by mouth once daily.     aspirin 81 MG EC tablet  Commonly known as:  ECOTRIN  Take 81 mg by mouth once daily.     calcium carbonate 600 mg calcium (1,500 mg) Tab  Commonly known as:  OS-PAL  Take 1 tablet by mouth once daily.     fenofibrate 160 MG Tab  TAKE 1 TABLET BY MOUTH DAILY TO LOWER TRIGLYERIDES     metFORMIN 500 MG tablet  Commonly known as:  GLUCOPHAGE  Take 500 mg by mouth 2 (two) times daily.     VITAMIN D2 50,000 unit Cap  Generic drug:  ergocalciferol  TAKE 1 CAPSULE WEEKLY FOR VITAMIN D REPLACMENT THERAPY          Discharge Procedure Orders   No driving until:   Order Comments: Until no longer taking narcotic     Notify your health care provider if you experience any of the following:  temperature >100.4     Notify your health care provider if you experience any of the following:  persistent nausea and vomiting or diarrhea     Notify your health care provider if you experience any of the following:  severe uncontrolled pain     Notify your health care provider if you experience any of the following:  difficulty breathing or increased cough     Notify your health care provider if you experience any of the following:  severe persistent headache     Notify your health care provider if you experience any of the following:   worsening rash     Notify your health care provider if you experience any of the following:  persistent dizziness, light-headedness, or visual disturbances     Other restrictions (specify):   Order Comments: Pelvic rest- no sex, tampons, douching. Nothing in the vagina x 2 weeks     Activity as tolerated     Follow-up Information     Sapphire Jones MD. Schedule an appointment as soon as possible for a visit in 6 weeks.    Specialties:  Obstetrics, Obstetrics and Gynecology  Why:  Postoperative visit  Contact information:  [address]  [address] [place] [address] [place] [address] [phone]

## 2019-06-05 ENCOUNTER — TELEPHONE (OUTPATIENT)
Dept: OBSTETRICS AND GYNECOLOGY | Facility: CLINIC | Age: 69
End: 2019-06-05

## 2019-06-05 NOTE — TELEPHONE ENCOUNTER
----- Message from Mahogany Springer sent at 6/5/2019  2:49 PM CDT -----  Contact: [phone]/self  Patient called in returning your call. Please advise.

## 2019-06-11 ENCOUNTER — TELEPHONE (OUTPATIENT)
Dept: OBSTETRICS AND GYNECOLOGY | Facility: CLINIC | Age: 69
End: 2019-06-11

## 2019-06-11 NOTE — TELEPHONE ENCOUNTER
Called to inform pt of path results  no residual dysplasia and neg ecc. No answer. Can we make sure she is scheduled for her post op around the week of 7/2/19    Sapphire Jones MD, FACOG  OB/GYN  Pager: [phone]

## 2019-06-25 ENCOUNTER — INITIAL CONSULT (OUTPATIENT)
Dept: UROGYNECOLOGY | Facility: CLINIC | Age: 69
End: 2019-06-25
Payer: MEDICARE

## 2019-06-25 VITALS
SYSTOLIC BLOOD PRESSURE: 120 MMHG | HEIGHT: 63 IN | DIASTOLIC BLOOD PRESSURE: 72 MMHG | BODY MASS INDEX: 26.22 KG/M2 | WEIGHT: 148 LBS

## 2019-06-25 DIAGNOSIS — N81.11 CYSTOCELE, MIDLINE: ICD-10-CM

## 2019-06-25 DIAGNOSIS — R30.0 DYSURIA: Primary | ICD-10-CM

## 2019-06-25 DIAGNOSIS — N81.6 RECTOCELE, FEMALE: ICD-10-CM

## 2019-06-25 DIAGNOSIS — R35.1 NOCTURIA MORE THAN TWICE PER NIGHT: ICD-10-CM

## 2019-06-25 DIAGNOSIS — N81.4 UTEROVAGINAL PROLAPSE: ICD-10-CM

## 2019-06-25 DIAGNOSIS — N39.46 MIXED STRESS AND URGE URINARY INCONTINENCE: ICD-10-CM

## 2019-06-25 PROCEDURE — 99215 OFFICE O/P EST HI 40 MIN: CPT | Mod: 25,24,S$GLB, | Performed by: OBSTETRICS & GYNECOLOGY

## 2019-06-25 PROCEDURE — 99999 PR PBB SHADOW E&M-EST. PATIENT-LVL III: ICD-10-PCS | Mod: PBBFAC,,, | Performed by: OBSTETRICS & GYNECOLOGY

## 2019-06-25 PROCEDURE — 87086 URINE CULTURE/COLONY COUNT: CPT

## 2019-06-25 PROCEDURE — 51701 PR INSERTION OF NON-INDWELLING BLADDER CATHETERIZATION FOR RESIDUAL UR: ICD-10-PCS | Mod: S$GLB,,, | Performed by: OBSTETRICS & GYNECOLOGY

## 2019-06-25 PROCEDURE — 51701 INSERT BLADDER CATHETER: CPT | Mod: S$GLB,,, | Performed by: OBSTETRICS & GYNECOLOGY

## 2019-06-25 PROCEDURE — 99999 PR PBB SHADOW E&M-EST. PATIENT-LVL III: CPT | Mod: PBBFAC,,, | Performed by: OBSTETRICS & GYNECOLOGY

## 2019-06-25 PROCEDURE — 99215 PR OFFICE/OUTPT VISIT, EST, LEVL V, 40-54 MIN: ICD-10-PCS | Mod: 25,24,S$GLB, | Performed by: OBSTETRICS & GYNECOLOGY

## 2019-06-25 PROCEDURE — 1101F PT FALLS ASSESS-DOCD LE1/YR: CPT | Mod: CPTII,S$GLB,, | Performed by: OBSTETRICS & GYNECOLOGY

## 2019-06-25 PROCEDURE — 1101F PR PT FALLS ASSESS DOC 0-1 FALLS W/OUT INJ PAST YR: ICD-10-PCS | Mod: CPTII,S$GLB,, | Performed by: OBSTETRICS & GYNECOLOGY

## 2019-06-25 RX ORDER — TRIAMCINOLONE ACETONIDE 1 MG/G
CREAM TOPICAL
Refills: 2 | COMMUNITY
Start: 2019-05-28

## 2019-06-25 NOTE — LETTER
June 30, 2019      Sapphire Jones MD  [address]  [address]  [place] [address]           [facility]  [address]  [place] [address]  Phone: [phone]          Patient: Nia Ames   MR Number: [id number]   YOB: 1950   Date of Visit: 6/25/2019       Dear Dr. Sapphire Jones:    Thank you for referring Nia Ames to me for evaluation. Attached you will find relevant portions of my assessment and plan of care.    If you have questions, please do not hesitate to call me. I look forward to following Nia Ames along with you.    Sincerely,    Brianna Cordero MD    Enclosure  CC:  No Recipients    If you would like to receive this communication electronically, please contact [email or web address] or [phone] to request more information on [employer] access.    For providers and/or their staff who would like to refer a patient to [facility], please contact us through our one-stop-shop provider referral line, [facility], [place], at [phone].    If you feel you have received this communication in error or would no longer like to receive these types of communications, please e-mail [email or web address]

## 2019-06-25 NOTE — PATIENT INSTRUCTIONS
Bladder Irritants  Certain foods and drinks have been associated with worsening symptoms of urinary frequency, urgency, urge incontinence, or bladder pain. If you suffer from any of these conditions, you may wish to try eliminating one or more of these foods from your diet and see if your symptoms improve. If bladder symptoms are related to dietary factors, strict adherence to a diet thateliminates the food should bring marked relief in 10 days. Once you are feeling better, you can begin to add foods back into your diet, one at a time. If symptoms return, you will be able to identify the irritant. As you add foods back to your diet it is very important that you drink significant amounts of water.    -----------------------------------------------------------------------------------------------  List of Common Bladder Irritants*  Alcoholic beverages  Apples and apple juice  Cantaloupe  Carbonated beverages  Chili and spicy foods  Chocolate  Citrus fruit  Coffee (including decaffeinated)  Cranberries and cranberry juice  Grapes  Guava  Milk Products: milk, cheese, cottage cheese, yogurt, ice cream  Peaches  Pineapple  Plums  Strawberries  Sugar especially artificial sweeteners, saccharin, aspartame, corn sweeteners, honey, fructose, sucrose, lactose  Tea  Tomatoes and tomato juice  Vitamin B complex  Vinegar  *Most people are not sensitive to ALL of these products; your goal is to find the foods that make YOUR symptoms worse.  ---------------------------------------------------------------------------------------------------    Low-acid fruit substitutions include apricots, papaya, pears and watermelon. Coffee drinkers can drink Kava or other lowacid instant drinks. Tea drinkers can substitute non-citrus herbal and sun brewed teas. Calcium carbonate co-buffered with calcium ascorbate can be substituted for Vitamin C. Prelief is a dietary supplement that works as an acid blocker for the bladder.    Where to get more  information:        Overcoming Bladder Disorders by Mary Sigala and Rosie Velasquez, 1990        You Dont Have to Live with Cystitis! By Barbi Tolentino, 1988  · http://[email or web address].org/oab    ------------------------------------------------------------    1)  Stage 3 cystocele, stage 2 rectocele, stage 2 uterine prolapse:  --discussed pathophysiology  --observation vs pessary vs surgery (TVH/USS/A&P)   --if surgery: would need bladder testing to see if need sling for hidden leakage and pelvic US    --would like to watch for now  --if starts to bother you, or if you have more trouble emptying bladder or more frequent bladder infections, may need to try pessary or surgery  --Empty bladder every 3 hours.  Empty well: wait a minute, lean forward on toilet.  Can gently reduce bulge with fingers to help empty completely.       2) Mixed urinary incontinence, urge > stress:    --urine C&S  --Empty bladder every 3 hours.  Empty well: wait a minute, lean forward on toilet.    --Avoid dietary irritants (see sheet).  Keep diary x 3-5 days to determine your irritants.  --KEGELS: do 10 in AM and 10 in PM, holding each x 10 seconds.  When you feel urge to go, STOP, KEGEL, and when urge has passed, then go to bathroom.  Consider PT in future.    --URGE: consider medication in future.  Takes 2-4 weeks to see if will have effect.  For dry mouth: get sour, sugar free lozenge or gum.    --STRESS:  Pessary vs. Sling.     3)  Nocturia (nighttime urination): stop fluids 2 hours before bed/no fluids by bed.  If have leg swelling:  Elevate feet above chest x 1 hour before bed to get excess fluid off.  Can also use support hose (knee highs).      4)  RTC as needed/if would like intervention let us know.  Follow up with Dr. Jones as scheduled.

## 2019-06-25 NOTE — PROGRESS NOTES
[facility] - GYNECOLOGY  [address]  [place] [address]    [facility]  [id number]  1950    Consulting Physician: Sapphire oJnes MD   GYN: MD Karen  Primary M.D.: Kathy Barth MD    Chief Complaint   Patient presents with    Consult     urinary incontinence       HPI:     1)  UI:  (+) RAJESH (rare) < (+) UUI (occasionally) X rare.  (+) pads--wearing due to healing CKC. No h/o UI.  Daytime frequency: Q 1-3 hours.  Nocturia: Yes: 2/night.   (--) dysuria,  (--) hematuria,  (--) frequent UTIs.  (+) complete bladder emptying. PV to help.     2)  POP:  Present. below introitus.  Symptoms:(+)  Increased urinary frequency, occasional vaginal pressure, bothersome.  (--) vaginal bleeding. (--) vaginal discharge. (--) sexually active.  (--) h/o dyspareunia.  (+)  Vaginal dryness.  (--) vaginal estrogen use.     3)  BM:  (--) constipation/straining.  (--) chronic diarrhea. (--) hematochezia.  (--) fecal incontinence.  (+) fecal smearing/urgency.  (+) complete evacuation.     Past Medical History  Past Medical History:   Diagnosis Date    Diabetes mellitus     Hypertension    DM: random avs.  Thinks HbA1c 7.?% takes metformin. ?B foot neuropathy.  No known secondary disease.     Past Surgical History  Past Surgical History:   Procedure Laterality Date    CONE BIOPSY, CERVIX, USING COLD KNIFE N/A 2019    Performed by Sapphire Jones MD at [facility] OR    spinal fusions      TONSILLECTOMY      TUBAL LIGATION     PP BTL    [facility] 19 for CIN3 Pap:  1. LEEP SPECIMEN FROM THE CERVIX:  NO RESIDUAL DYSPLASIA IDENTIFIED  FOCAL CHANGES COMPATIBLE WITH MILD HPV EFFECT  CHRONIC ENDOCERVICITIS WITH SQUAMOUS METAPLASIA  2. POST CONE ENDOCERVICAL CURETTINGS:  ENDOCERVICAL GLANDULAR FRAGMENTS WITH NO DYSPLASIA IDENTIFIED    Hysterectomy: No    Past Ob History     x 4.  C/s x 0.    Largest infant weight: 8#1oz.   yes FAVD. yes episiotomy.      Gynecologic History  LMP: No LMP  recorded (lmp unknown). Patient is postmenopausal.  Age of menarche: 12 yo  Age of menopause: late 40s  Menstrual history: h/o menorrhagia  Pap test:2019 neg, HPV +.  History of abnormal paps: Yes - MAO 1 Bx/CIN2-3 endocervix; s/p CKC 6/4/19 for CIN3 on pap; margins, endocervical NEG. No previous issues.  History of STIs:  No  Mammogram: Date of last: 2018/18?.  Result: Normal per report. Followed per PCP.   Colonoscopy: to be scheduled per PCP  DEXA:  Date of last: 2019.  Result:  osteopenia ([place]).  Repeat due:  Per PCP.     Family History  Family History   Problem Relation Age of Onset    Heart attack Father     Diabetes Mother     Breast cancer Neg Hx     Cancer Neg Hx     Ovarian cancer Neg Hx     Vaginal cancer Neg Hx     Endometrial cancer Neg Hx     Cervical cancer Neg Hx       Colon CA: No  Breast CA: No  GYN CA: No   CA: No    Social History  Social History     Tobacco Use   Smoking Status Heavy Tobacco Smoker    Packs/day: 0.50     PPD:  1/2 current (1 at most) x 50 years.   Social History     Substance and Sexual Activity   Alcohol Use No    Frequency: Never   .    Social History     Substance and Sexual Activity   Drug Use No     The patient is in relationship.  Resides in [place] [address].  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     Allergies  Review of patient's allergies indicates:   Allergen Reactions    Codeine Hives    Penicillins Rash       Medications  Current Outpatient Medications on File Prior to Visit   Medication Sig Dispense Refill    amLODIPine (NORVASC) 10 MG tablet Take 10 mg by mouth once daily.      calcium carbonate (OS-PAL) 600 mg calcium (1,500 mg) Tab Take 1 tablet by mouth once daily.  3    metFORMIN (GLUCOPHAGE) 500 MG tablet Take 500 mg by mouth 2 (two) times daily.  3    triamcinolone acetonide 0.1% (KENALOG) 0.1 % cream APPLY TWICE A DAY AS NEEDED FOR ITCHY RAISED SKIN. DON'T USE ON FACE, ARMPITS, OR GROIN.  2    VITAMIN D2 50,000  "unit capsule TAKE 1 CAPSULE WEEKLY FOR VITAMIN D REPLACMENT THERAPY  3    aspirin (ECOTRIN) 81 MG EC tablet Take 81 mg by mouth once daily.  2    fenofibrate 160 MG Tab TAKE 1 TABLET BY MOUTH DAILY TO LOWER TRIGLYERIDES  0    HYDROcodone-acetaminophen (NORCO) 5-325 mg per tablet Take 1 tablet by mouth every 12 (twelve) hours as needed for Pain. 10 tablet 0    ibuprofen (ADVIL,MOTRIN) 600 MG tablet Take 1 tablet (600 mg total) by mouth every 6 (six) hours as needed for Pain. 30 tablet 0     No current facility-administered medications on file prior to visit.        Review of Systems A 14 point ROS was reviewed with pertinent positives as noted above in the history of present illness.      Constitutional: negative  Eyes: negative  Endocrine: negative  Gastrointestinal: negative  Cardiovascular: negative  Respiratory: negative  Allergic/Immunologic: negative  Integumentary: negative  Psychiatric: negative  Musculoskeletal: negative   Ear/Nose/Throat: negative  Neurologic: negative  Genitourinary: SEE HPI  Hematologic/Lymphatic: negative   Breast: negative    Urogynecologic Exam  /72 (BP Location: Right arm, Patient Position: Sitting, BP Method: Large (Manual))   Ht 5' 3" (1.6 m)   Wt 67.1 kg (148 lb)   LMP  (LMP Unknown)   BMI 26.22 kg/m²     GENERAL APPEARANCE:  The patient is well-developed, well-nourished.  Neck:  Supple with no thyromegaly, no carotid bruits.  Heart:  Regular rate and rhythm, no murmurs, rubs or gallops.  Lungs:  Clear.  No CVA tenderness.  Abdomen:  Soft, nontender, nondistended, no hepatosplenomegaly.  Incisions:  Umbilical well-healed    PELVIC:    External genitalia:  Normal Bartholins, Skenes and labia bilaterally.    Urethra:  No caruncle, diverticulum or masses.  (+) hypermobility.    Vagina:  Atrophy (+) , no bladder masses or tender, no discharge.    Cervix:  normal appearance; s/p CKC, healing well  Uterus: normal size, contour, position, consistency, mobility, " non-tender  Adnexa: Not palpable.    POP-Q:  Aa +2; Ba +2; C -4; Ap -1; Bp -1; D -6.  Genital hiatus 3, perineal body 2, total vaginal length 10-11.  Levators widened.      NEUROLOGIC:  Cranial nerves 2 through 12 intact.  Strength 5/5.  DTRs 2+ lower extremities.  S2 through 4 normal.  Sacral reflexes intact.    EXT: RODRIGUEZ, 2+ pulses bilaterally, no C/C/E    COUGH STRESS TEST:  negative  KEGEL: 1 /5    RECTAL:    External:  Normal, (--) hemorrhoids, (--) dovetailing.   Internal:   deferred    PVR: 20 mL    The patient was fit with #4 UI + POP pessary.  She was able to tolerate the device comfortabley with bending, squatting, valsalva.  She was not able to demonstrate independent removal and placement.  She tolerated the procedure well.  Wants to wait to use for now.       Impression    1. Dysuria    2. Cystocele, midline    3. Rectocele, female    4. Uterovaginal prolapse    5. Mixed stress and urge urinary incontinence    6. Nocturia more than twice per night        Initial Plan  The patient was counseled regarding these issues. The patient was given a summary sheet containing each of these issues with possible options for evaluation and management. When appropriate, we also reviewed computer-generated diagrams specific to their diagnoses..  All questions were addressed to the patient's satisfaction.    1)  Stage 3 cystocele, stage 2 rectocele, stage 2 uterine prolapse:  --discussed pathophysiology  --observation vs pessary vs surgery (TVH/USS/A&P)   --if surgery: would need bladder testing to see if need sling for hidden leakage and pelvic US    --would like to watch for now  --if starts to bother you, or if you have more trouble emptying bladder or more frequent bladder infections, may need to try pessary or surgery  --Empty bladder every 3 hours.  Empty well: wait a minute, lean forward on toilet.  Can gently reduce bulge with fingers to help empty completely.       2) Mixed urinary incontinence, urge > stress:     --urine C&S  --Empty bladder every 3 hours.  Empty well: wait a minute, lean forward on toilet.    --Avoid dietary irritants (see sheet).  Keep diary x 3-5 days to determine your irritants.  --KEGELS: do 10 in AM and 10 in PM, holding each x 10 seconds.  When you feel urge to go, STOP, KEGEL, and when urge has passed, then go to bathroom.  Consider PT in future.    --URGE: consider medication in future.  Takes 2-4 weeks to see if will have effect.  For dry mouth: get sour, sugar free lozenge or gum.    --STRESS:  Pessary vs. Sling.     3)  Nocturia (nighttime urination): stop fluids 2 hours before bed/no fluids by bed.  If have leg swelling:  Elevate feet above chest x 1 hour before bed to get excess fluid off.  Can also use support hose (knee highs).      4)  RTC as needed/if would like intervention let us know.  Follow up with Dr. Jones as scheduled.    Approximately 60 min were spent in consult, 90 % in discussion.     Thank you for requesting consultation of your patient.  I look forward to participating in their care.    Brianna Cordero  Female Pelvic Medicine and Reconstructive Surgery  [facility] [place], [place]

## 2019-06-26 LAB — BACTERIA UR CULT: NO GROWTH

## 2019-07-02 ENCOUNTER — OFFICE VISIT (OUTPATIENT)
Dept: OBSTETRICS AND GYNECOLOGY | Facility: CLINIC | Age: 69
End: 2019-07-02
Payer: MEDICARE

## 2019-07-02 VITALS
DIASTOLIC BLOOD PRESSURE: 78 MMHG | BODY MASS INDEX: 26.31 KG/M2 | HEIGHT: 63 IN | SYSTOLIC BLOOD PRESSURE: 128 MMHG | WEIGHT: 148.5 LBS

## 2019-07-02 DIAGNOSIS — Z98.890 POST-OPERATIVE STATE: Primary | ICD-10-CM

## 2019-07-02 PROCEDURE — 99024 PR POST-OP FOLLOW-UP VISIT: ICD-10-PCS | Mod: S$GLB,,, | Performed by: OBSTETRICS & GYNECOLOGY

## 2019-07-02 PROCEDURE — 99024 POSTOP FOLLOW-UP VISIT: CPT | Mod: S$GLB,,, | Performed by: OBSTETRICS & GYNECOLOGY

## 2019-07-02 PROCEDURE — 99999 PR PBB SHADOW E&M-EST. PATIENT-LVL III: CPT | Mod: PBBFAC,,, | Performed by: OBSTETRICS & GYNECOLOGY

## 2019-07-02 PROCEDURE — 99999 PR PBB SHADOW E&M-EST. PATIENT-LVL III: ICD-10-PCS | Mod: PBBFAC,,, | Performed by: OBSTETRICS & GYNECOLOGY

## 2019-07-02 NOTE — PROGRESS NOTES
"CC: Postoperative visit    Nia Ames is a 68 y.o. female  presents for a postoperative visit s/p [facility] on 19.  Her postoperative course was uncomplicated.  She is doing well postoperatively.     Pathology showed:  NO RESIDUAL DYSPLASIA IDENTIFIED  FOCAL CHANGES COMPATIBLE WITH MILD HPV EFFECT  CHRONIC ENDOCERVICITIS WITH SQUAMOUS METAPLASIA  2. POST CONE ENDOCERVICAL CURETTINGS:  ENDOCERVICAL GLANDULAR FRAGMENTS WITH NO DYSPLASIA IDENTIFIED      /78   Ht 5' 3" (1.6 m)   Wt 67.4 kg (148 lb 7.7 oz)   LMP  (LMP Unknown)   BMI 26.30 kg/m²     ROS:  GENERAL: No fever, chills, fatigability or weight loss.  VULVAR: No pain, no lesions and no itching.  VAGINAL: No relaxation, no itching, no discharge, no abnormal bleeding and no lesions.  ABDOMEN: No abdominal pain. Denies nausea. Denies vomiting. No diarrhea. No constipation  BREAST: Denies pain. No lumps. No discharge.  URINARY: No incontinence, no nocturia, no frequency and no dysuria.  CARDIOVASCULAR: No chest pain. No shortness of breath. No leg cramps.  NEUROLOGICAL: No headaches. No vision changes.    Physical Exam  PELVIC: Normal external genitalia without lesions.  Normal hair distribution.  Adequate perineal body, normal urethral meatus.  Vagina moist and well rugated without lesions or discharge.  Cervix pink, without lesions, discharge or tenderness.  No significant cystocele or rectocele.  Bimanual exam shows uterus to be normal size, regular, mobile and nontender.  Adnexa without masses or tenderness.      1. Post-operative state         Patient can return to normal activities. Recently seen by Dr. Cordero- for Stage 3 cystocele, stage 2 rectocele, stage 2 uterine prolapse. Pt declined pessary. Will notify urogyn if she desires to proceed with surgery.  Return to clinic in 1 year for well woman exam.    Sapphire Jones MD, FACOG  OB/GYN  Pager: [phone]    "

## 2021-10-05 DIAGNOSIS — Z12.31 BREAST CANCER SCREENING BY MAMMOGRAM: Primary | ICD-10-CM

## 2021-10-12 ENCOUNTER — HOSPITAL ENCOUNTER (OUTPATIENT)
Dept: RADIOLOGY | Facility: HOSPITAL | Age: 71
Discharge: HOME | End: 2021-10-12
Attending: INTERNAL MEDICINE
Payer: MEDICARE

## 2021-10-12 DIAGNOSIS — Z12.31 BREAST CANCER SCREENING BY MAMMOGRAM: ICD-10-CM

## 2021-10-12 PROCEDURE — 77067 SCR MAMMO BI INCL CAD: CPT | Mod: TC,PO

## 2023-02-26 ENCOUNTER — HOSPITAL ENCOUNTER (EMERGENCY)
Facility: HOSPITAL | Age: 73
Discharge: HOME | End: 2023-02-26
Attending: EMERGENCY MEDICINE
Payer: MEDICARE

## 2023-02-26 VITALS
OXYGEN SATURATION: 99 % | HEIGHT: 63 IN | BODY MASS INDEX: 27.5 KG/M2 | WEIGHT: 155.19 LBS | TEMPERATURE: 98 F | RESPIRATION RATE: 18 BRPM | SYSTOLIC BLOOD PRESSURE: 193 MMHG | HEART RATE: 104 BPM | DIASTOLIC BLOOD PRESSURE: 96 MMHG

## 2023-02-26 DIAGNOSIS — R09.81 NASAL CONGESTION: Primary | ICD-10-CM

## 2023-02-26 DIAGNOSIS — I10 HYPERTENSION, UNSPECIFIED TYPE: ICD-10-CM

## 2023-02-26 LAB — SARS-COV-2 RDRP RESP QL NAA+PROBE: NEGATIVE

## 2023-02-26 PROCEDURE — 99283 EMERGENCY DEPT VISIT LOW MDM: CPT | Mod: ER

## 2023-02-26 PROCEDURE — U0002 COVID-19 LAB TEST NON-CDC: HCPCS | Mod: ER | Performed by: EMERGENCY MEDICINE

## 2023-02-26 RX ORDER — MUPIROCIN 20 MG/G
OINTMENT TOPICAL 3 TIMES DAILY
Qty: 30 G | Refills: 0 | Status: SHIPPED | OUTPATIENT
Start: 2023-02-26

## 2023-02-26 NOTE — ED PROVIDER NOTES
"Encounter Date: 2/26/2023       History     Chief Complaint   Patient presents with    Sinusitis     Pt states "Im having a problem with my sinuses and I used a bryant clipper thing and I think I irrigated it more and now Im having a breathing problem" O2 99%.      72-year-old female with a history of hypertension and diabetes presents with congestion for the past few days.  Patient has been using saline nasal spray.  She also used a man's nose hair isabel because she thought that trimming her nose hair would help.  No cough.  No fever.  Mild sore throat yesterday but that is resolved.  No shortness of breath.  Patient does say that she has been under lots of stress lately because her [employer] trailer is having to be replaced.    Review of patient's allergies indicates:   Allergen Reactions    Codeine Hives    Penicillins Rash     Past Medical History:   Diagnosis Date    Diabetes mellitus     Hypertension      Past Surgical History:   Procedure Laterality Date    COLD KNIFE CONIZATION OF CERVIX N/A 6/4/2019    Procedure: CONE BIOPSY, CERVIX, USING COLD KNIFE;  Surgeon: Sapphire Jones MD;  Location: [facility];  Service: OB/GYN;  Laterality: N/A;    spinal fusions      TONSILLECTOMY      TUBAL LIGATION       Family History   Problem Relation Age of Onset    Heart attack Father     Diabetes Mother     Breast cancer Neg Hx     Cancer Neg Hx     Ovarian cancer Neg Hx     Vaginal cancer Neg Hx     Endometrial cancer Neg Hx     Cervical cancer Neg Hx      Social History     Substance Use Topics    Alcohol use: No    Drug use: No     Review of Systems   Constitutional:  Negative for fever.   HENT:  Positive for congestion and sore throat (Resolved).    Respiratory:  Negative for cough and shortness of breath.    Neurological:  Negative for headaches.     Physical Exam     Initial Vitals [02/26/23 0733]   BP Pulse Resp Temp SpO2   (!) 193/96 104 18 98.3 °F (36.8 °C) 99 %      MAP       --         Physical Exam    Nursing note and " vitals reviewed.  HENT:   Head: Atraumatic.   Mouth/Throat: Oropharynx is clear and moist.   Mild irritation to the opening of the nares without purulence drainage   Eyes: Conjunctivae and EOM are normal.   Neck: Neck supple.   Normal range of motion.  Pulmonary/Chest: Breath sounds normal. No respiratory distress.   Musculoskeletal:      Cervical back: Normal range of motion and neck supple.     Neurological: She is alert and oriented to person, place, and time.       ED Course   Procedures  Labs Reviewed   SARS-COV-2 RNA AMPLIFICATION, QUAL    Narrative:     Is the patient symptomatic?->Yes          Imaging Results    None          Medications - No data to display  Medical Decision Making:   Initial Assessment:   72-year-old female with congestion for few days and now irritation to the nostrils after using saline nasal spray frequently as well as nose hair isabel.  Vital signs notable for hypertension.  No evidence of hypertensive emergency on exam.  Lungs are clear.  Mild irritation to the nares.  Plan for mupirocin ointment for this.  COVID negative.  Discharged home with mupirocin and PCP follow-up.                        Clinical Impression:   Final diagnoses:  [R09.81] Nasal congestion (Primary)  [I10] Hypertension, unspecified type        ED Disposition Condition    Discharge Stable          ED Prescriptions       Medication Sig Dispense Start Date End Date Auth. Provider    mupirocin (BACTROBAN) 2 % ointment Apply topically 3 (three) times daily. 30 g 2/26/2023 -- Dereck Winston MD          Follow-up Information       Follow up With Specialties Details Why Contact Info    Misael Yu MD Family Medicine Schedule an appointment as soon as possible for a visit in 1 week   [address]  [address] [facility] [address]  [phone]               Dereck Winston MD  02/26/23 0821

## 2023-03-10 ENCOUNTER — HOSPITAL ENCOUNTER (OUTPATIENT)
Dept: RADIOLOGY | Facility: HOSPITAL | Age: 73
Discharge: HOME | End: 2023-03-10
Attending: NURSE PRACTITIONER
Payer: MEDICARE

## 2023-03-10 DIAGNOSIS — J32.9 CHRONIC SINUSITIS, UNSPECIFIED: ICD-10-CM

## 2023-03-10 PROCEDURE — 70220 X-RAY EXAM OF SINUSES: CPT | Mod: 26,,, | Performed by: RADIOLOGY

## 2023-03-10 PROCEDURE — 70220 XR SINUSES MIN 3 VIEWS: ICD-10-PCS | Mod: 26,,, | Performed by: RADIOLOGY

## 2023-03-10 PROCEDURE — 70220 X-RAY EXAM OF SINUSES: CPT | Mod: TC,FY,PO

## 2024-01-08 ENCOUNTER — OFFICE VISIT (OUTPATIENT)
Dept: UROLOGY | Facility: CLINIC | Age: 74
End: 2024-01-08
Payer: MEDICARE

## 2024-01-08 ENCOUNTER — LAB VISIT (OUTPATIENT)
Dept: LAB | Facility: HOSPITAL | Age: 74
End: 2024-01-08
Attending: UROLOGY
Payer: MEDICARE

## 2024-01-08 VITALS
HEART RATE: 108 BPM | HEIGHT: 63 IN | SYSTOLIC BLOOD PRESSURE: 189 MMHG | BODY MASS INDEX: 27.85 KG/M2 | DIASTOLIC BLOOD PRESSURE: 111 MMHG | WEIGHT: 157.19 LBS

## 2024-01-08 DIAGNOSIS — N39.41 URGE INCONTINENCE: ICD-10-CM

## 2024-01-08 DIAGNOSIS — N81.3 PROCIDENTIA OF UTERUS: ICD-10-CM

## 2024-01-08 DIAGNOSIS — N81.3 PROCIDENTIA OF UTERUS: Primary | ICD-10-CM

## 2024-01-08 LAB
ANION GAP SERPL CALC-SCNC: 13 MMOL/L (ref 8–16)
BUN SERPL-MCNC: 13 MG/DL (ref 8–23)
CALCIUM SERPL-MCNC: 9.9 MG/DL (ref 8.7–10.5)
CHLORIDE SERPL-SCNC: 102 MMOL/L (ref 95–110)
CO2 SERPL-SCNC: 24 MMOL/L (ref 23–29)
CREAT SERPL-MCNC: 0.9 MG/DL (ref 0.5–1.4)
EST. GFR  (NO RACE VARIABLE): >60 ML/MIN/1.73 M^2
GLUCOSE SERPL-MCNC: 120 MG/DL (ref 70–110)
POTASSIUM SERPL-SCNC: 4.5 MMOL/L (ref 3.5–5.1)
SODIUM SERPL-SCNC: 139 MMOL/L (ref 136–145)

## 2024-01-08 PROCEDURE — 51701 INSERT BLADDER CATHETER: CPT | Mod: S$GLB,,, | Performed by: UROLOGY

## 2024-01-08 PROCEDURE — 87088 URINE BACTERIA CULTURE: CPT | Performed by: UROLOGY

## 2024-01-08 PROCEDURE — 3080F DIAST BP >= 90 MM HG: CPT | Mod: CPTII,S$GLB,, | Performed by: UROLOGY

## 2024-01-08 PROCEDURE — 1159F MED LIST DOCD IN RCRD: CPT | Mod: CPTII,S$GLB,, | Performed by: UROLOGY

## 2024-01-08 PROCEDURE — 99204 OFFICE O/P NEW MOD 45 MIN: CPT | Mod: 25,S$GLB,, | Performed by: UROLOGY

## 2024-01-08 PROCEDURE — 3077F SYST BP >= 140 MM HG: CPT | Mod: CPTII,S$GLB,, | Performed by: UROLOGY

## 2024-01-08 PROCEDURE — 1100F PTFALLS ASSESS-DOCD GE2>/YR: CPT | Mod: CPTII,S$GLB,, | Performed by: UROLOGY

## 2024-01-08 PROCEDURE — 3008F BODY MASS INDEX DOCD: CPT | Mod: CPTII,S$GLB,, | Performed by: UROLOGY

## 2024-01-08 PROCEDURE — 99999 PR PBB SHADOW E&M-EST. PATIENT-LVL III: CPT | Mod: PBBFAC,,, | Performed by: UROLOGY

## 2024-01-08 PROCEDURE — 3288F FALL RISK ASSESSMENT DOCD: CPT | Mod: CPTII,S$GLB,, | Performed by: UROLOGY

## 2024-01-08 PROCEDURE — 80048 BASIC METABOLIC PNL TOTAL CA: CPT | Performed by: UROLOGY

## 2024-01-08 PROCEDURE — 87086 URINE CULTURE/COLONY COUNT: CPT | Performed by: UROLOGY

## 2024-01-08 PROCEDURE — 81002 URINALYSIS NONAUTO W/O SCOPE: CPT | Mod: S$GLB,,, | Performed by: UROLOGY

## 2024-01-08 PROCEDURE — 36415 COLL VENOUS BLD VENIPUNCTURE: CPT | Performed by: UROLOGY

## 2024-01-08 PROCEDURE — 1126F AMNT PAIN NOTED NONE PRSNT: CPT | Mod: CPTII,S$GLB,, | Performed by: UROLOGY

## 2024-01-08 RX ORDER — LATANOPROST 50 UG/ML
SOLUTION/ DROPS OPHTHALMIC
COMMUNITY

## 2024-01-08 RX ORDER — LIDOCAINE HYDROCHLORIDE 20 MG/ML
JELLY TOPICAL ONCE
OUTPATIENT
Start: 2024-01-08 | End: 2024-01-08

## 2024-01-08 RX ORDER — DOXYCYCLINE HYCLATE 100 MG
100 TABLET ORAL ONCE
OUTPATIENT
Start: 2024-01-08 | End: 2024-01-08

## 2024-01-08 NOTE — PROGRESS NOTES
CHIEF COMPLAINT:    Mrs. Ames is a 73 y.o. female presenting as a self referred patient for history of uterine prolapse.    PRESENTING ILLNESS:    Nia Ames is a 73 y.o. female who presents with a history of pelvic organ prolapse.  She states she saw Dr. Cordero and review of her chart confirms that she was seen x 1 on 2019.  She was noted to have a stage III prolapse at that point and a pessary was recommended but the patient did not desire a pessary.  The patient states she has worsening of the prolapse.  She has urge incontinence, wears a large pad and a pull up for urge incontinence.  She is bothered by her symptoms.  History is challenging as she is anxious and expresses that she is embarrassed.  She states she has a history of conization of the cervix.  No issues after this was done.  See below for path.     Duration of symptoms: > 4 years    She is bothered by the prolapse because it is beyond the lips of the vagina and is uncomfortable.  She does not splint to urinate or defecate.  T    Pad use:  unknown pads, she gets pull ups from her insurance.      Previous therapies:  offered a pessary but she declined    Activities the incontinence prevents:  sounds like she does not like to go out of the house due to the prolapse    Gross hematuria:  none    Recurrent UTI:  none    , uterus present, not sexually active (no partner), the bowels are normal.     REVIEW OF SYSTEMS:    Review of Systems   Constitutional: Negative.    HENT: Negative.     Eyes: Negative.    Respiratory: Negative.     Cardiovascular: Negative.    Gastrointestinal: Negative.    Genitourinary: Negative.    Musculoskeletal: Negative.    Skin: Negative.    Neurological: Negative.    Endo/Heme/Allergies: Negative.    Psychiatric/Behavioral:  The patient is nervous/anxious.        PATIENT HISTORY:    Past Medical History:   Diagnosis Date    Diabetes mellitus     Hypertension        Past Surgical History:   Procedure  Laterality Date    COLD KNIFE CONIZATION OF CERVIX N/A 6/4/2019    Procedure: CONE BIOPSY, CERVIX, USING COLD KNIFE;  Surgeon: Sapphire Jones MD;  Location: [facility];  Service: OB/GYN;  Laterality: N/A;    spinal fusions      TONSILLECTOMY      TUBAL LIGATION         Family History   Problem Relation Age of Onset    Heart attack Father     Diabetes Mother      Social History     Socioeconomic History    Marital status: Single   Tobacco Use    Smoking status: Some Days     Current packs/day: 0.50     Types: Cigarettes    Smokeless tobacco: Never   Substance and Sexual Activity    Alcohol use: No    Drug use: No    Sexual activity: Not Currently   Social History Narrative    ** Merged History Encounter **         ** Merged History Encounter **         ** Merged History Encounter **            Allergies:  Codeine and Penicillins    Medications:  Outpatient Encounter Medications as of 1/8/2024   Medication Sig Dispense Refill    amLODIPine (NORVASC) 10 MG tablet Take 10 mg by mouth once daily.      ibuprofen (ADVIL,MOTRIN) 600 MG tablet Take 1 tablet (600 mg total) by mouth every 6 (six) hours as needed for Pain. 30 tablet 0    latanoprost 0.005 % ophthalmic solution Place into both eyes.      metFORMIN (GLUCOPHAGE) 500 MG tablet Take 500 mg by mouth daily with breakfast.  3    VITAMIN D2 50,000 unit capsule TAKE 1 CAPSULE WEEKLY FOR VITAMIN D REPLACMENT THERAPY  3    calcium carbonate (OS-PAL) 600 mg calcium (1,500 mg) Tab Take 1 tablet by mouth once daily.  3    mupirocin (BACTROBAN) 2 % ointment Apply topically 3 (three) times daily. (Patient not taking: Reported on 1/8/2024) 30 g 0    triamcinolone acetonide 0.1% (KENALOG) 0.1 % cream APPLY TWICE A DAY AS NEEDED FOR ITCHY RAISED SKIN. DON'T USE ON FACE, ARMPITS, OR GROIN.  2     No facility-administered encounter medications on file as of 1/8/2024.         PHYSICAL EXAMINATION:    The patient generally appears in good health, is appropriately interactive, and is in  no apparent distress.    Skin: No lesions.    Mental: Cooperative with normal affect.    Neuro: Grossly intact.    HEENT: Normal. No evidence of lymphadenopathy.    Chest:  normal inspiratory effort.    Abdomen: Soft, non-tender. No masses or organomegaly. Bladder is not palpable. No evidence of flank discomfort. No evidence of inguinal hernia.    Extremities: No clubbing, cyanosis, or edema    She has complete procidentia with the turn point anteriorly just below the meatus and posteriorly, it is -2.  Amount in the bladder by catheterization was 230 ml    Aa +3, Ba +3, C +9  gH 6, pB 2, TVL  9.5  Ap +3, Bp +3, D +8  Turn point anteriorly is just below the urethral meatus and posteriorly it is 2 cm.    LABS:    Lab Results   Component Value Date    BUN 13 05/29/2019    CREATININE 0.8 05/29/2019       UA 1.020, pH 5, tr nitrite +, 250 blood, otherwise, negative.     CKC 6/4/19 for CIN3 Pap:  1. LEEP SPECIMEN FROM THE CERVIX:  NO RESIDUAL DYSPLASIA IDENTIFIED  FOCAL CHANGES COMPATIBLE WITH MILD HPV EFFECT  CHRONIC ENDOCERVICITIS WITH SQUAMOUS METAPLASIA  2. POST CONE ENDOCERVICAL CURETTINGS:  ENDOCERVICAL GLANDULAR FRAGMENTS WITH NO DYSPLASIA IDENTIFIED    IMPRESSION:    Procidentia of the uterus.      PLAN:    1.  Renal ultrasound and BMP  2.  Suds/cysto.  Information sheets for [facility] for urodynamics and cystoscopy provided  3.  She does not desire to be sexually active in the future.  She does not have a partner at this point.  Discussed Colpocleisis, [facility] informational leaflet provided  4.  The catheterized specimen was sent for culture    I spent 45 minutes with the patient of which more than half was spent in direct consultation with the patient in regards to our treatment and plan.

## 2024-01-09 LAB — BACTERIA UR CULT: ABNORMAL

## 2024-02-25 NOTE — PLAN OF CARE
Pt denies pain or discomfort @ this time. Pt states she is ready to be discharged home @ this time. PIV dc'd with tip intact. Dressing placed. Discharge instructions reviewed with patient and family, with time allotted for questions. Pt and family verbalize understanding of instructions and state they have no further questions @ this time. Wheeled out to family's car.       
VSS. Denies pain or discomfort @ this time. Up to bathroom with no difficulty. Urinated large amount of clear yellow urine in toilet. Un measured. Liquids served. Cont to monitor.   
(1) Outpatient Area

## 2024-03-15 ENCOUNTER — PROCEDURE VISIT (OUTPATIENT)
Dept: UROLOGY | Facility: CLINIC | Age: 74
End: 2024-03-15
Payer: MEDICARE

## 2024-03-15 VITALS
HEART RATE: 107 BPM | BODY MASS INDEX: 27.46 KG/M2 | HEIGHT: 63 IN | WEIGHT: 155 LBS | SYSTOLIC BLOOD PRESSURE: 213 MMHG | TEMPERATURE: 98 F | DIASTOLIC BLOOD PRESSURE: 106 MMHG

## 2024-03-15 DIAGNOSIS — N30.90 BLADDER INFECTION: ICD-10-CM

## 2024-03-15 DIAGNOSIS — R82.90 ABNORMAL URINALYSIS: Primary | ICD-10-CM

## 2024-03-15 DIAGNOSIS — N81.3 PROCIDENTIA OF UTERUS: ICD-10-CM

## 2024-03-15 DIAGNOSIS — N39.41 URGE INCONTINENCE: ICD-10-CM

## 2024-03-15 PROCEDURE — 87088 URINE BACTERIA CULTURE: CPT | Performed by: UROLOGY

## 2024-03-15 PROCEDURE — 87086 URINE CULTURE/COLONY COUNT: CPT | Performed by: UROLOGY

## 2024-03-15 RX ORDER — DOXYCYCLINE HYCLATE 100 MG
100 TABLET ORAL ONCE
Status: CANCELLED | OUTPATIENT
Start: 2024-03-15 | End: 2024-03-15

## 2024-03-15 RX ORDER — LIDOCAINE HYDROCHLORIDE 20 MG/ML
JELLY TOPICAL ONCE
Status: CANCELLED | OUTPATIENT
Start: 2024-03-15 | End: 2024-03-15

## 2024-03-15 RX ORDER — SULFAMETHOXAZOLE AND TRIMETHOPRIM 800; 160 MG/1; MG/1
1 TABLET ORAL 2 TIMES DAILY
Qty: 14 TABLET | Refills: 0 | Status: SHIPPED | OUTPATIENT
Start: 2024-03-15 | End: 2024-03-22

## 2024-03-15 NOTE — PATIENT INSTRUCTIONS
_                                                                                                                                                                                             If any problems after hours or weekends, you may call [phone] and ask for the urology resident on call.

## 2024-03-15 NOTE — PROCEDURES
Procedures    Cancellation note:  the patient did the uroflow and the urine was dipped.  It was nitrite +, she states she has some dysuria.    The catheterized specimen was sent for culture    Bactrim DS sent to [facility] in [place].     Patient to be rescheduled.

## 2024-03-16 LAB — BACTERIA UR CULT: ABNORMAL

## 2024-04-05 ENCOUNTER — OFFICE VISIT (OUTPATIENT)
Dept: UROLOGY | Facility: CLINIC | Age: 74
End: 2024-04-05
Payer: MEDICARE

## 2024-04-05 VITALS
HEIGHT: 63 IN | HEART RATE: 95 BPM | BODY MASS INDEX: 27.75 KG/M2 | DIASTOLIC BLOOD PRESSURE: 109 MMHG | SYSTOLIC BLOOD PRESSURE: 167 MMHG | WEIGHT: 156.63 LBS

## 2024-04-05 DIAGNOSIS — R82.90 ABNORMAL URINALYSIS: ICD-10-CM

## 2024-04-05 DIAGNOSIS — N81.3 PROCIDENTIA OF UTERUS: Primary | ICD-10-CM

## 2024-04-05 DIAGNOSIS — N39.41 URGE INCONTINENCE: ICD-10-CM

## 2024-04-05 PROCEDURE — 1160F RVW MEDS BY RX/DR IN RCRD: CPT | Mod: CPTII,S$GLB,,

## 2024-04-05 PROCEDURE — 51701 INSERT BLADDER CATHETER: CPT | Mod: S$GLB,,,

## 2024-04-05 PROCEDURE — 87086 URINE CULTURE/COLONY COUNT: CPT

## 2024-04-05 PROCEDURE — 99999 PR PBB SHADOW E&M-EST. PATIENT-LVL III: CPT | Mod: PBBFAC,,,

## 2024-04-05 PROCEDURE — 1125F AMNT PAIN NOTED PAIN PRSNT: CPT | Mod: CPTII,S$GLB,,

## 2024-04-05 PROCEDURE — 3077F SYST BP >= 140 MM HG: CPT | Mod: CPTII,S$GLB,,

## 2024-04-05 PROCEDURE — 99214 OFFICE O/P EST MOD 30 MIN: CPT | Mod: 25,S$GLB,,

## 2024-04-05 PROCEDURE — 3080F DIAST BP >= 90 MM HG: CPT | Mod: CPTII,S$GLB,,

## 2024-04-05 PROCEDURE — 3008F BODY MASS INDEX DOCD: CPT | Mod: CPTII,S$GLB,,

## 2024-04-05 PROCEDURE — 1101F PT FALLS ASSESS-DOCD LE1/YR: CPT | Mod: CPTII,S$GLB,,

## 2024-04-05 PROCEDURE — 3288F FALL RISK ASSESSMENT DOCD: CPT | Mod: CPTII,S$GLB,,

## 2024-04-05 PROCEDURE — 1159F MED LIST DOCD IN RCRD: CPT | Mod: CPTII,S$GLB,,

## 2024-04-05 NOTE — PROGRESS NOTES
CHIEF COMPLAINT:  Cath urine collection       HISTORY OF PRESENTING ILLINESS:  Nia Ames is a 73 y.o. female who presents with a history of uterine prolapse. Presents to clinic today for a catheterized urine sample prior to SUDS/cystoscopy with Dr. Lambert scheduled 2024. Previous SUDS/cysto was canceled due to nitrite positive urine, pt was asymptomatic.     She states she saw Dr. Cordero and review of her chart confirms that she was seen x 1 on 2019.  She was noted to have a stage III prolapse at that point and a pessary was recommended but the patient did not desire a pessary.  The patient states she has worsening of the prolapse.  She has urge incontinence, wears a large pad and a pull up for urge incontinence.  She is bothered by her symptoms.  History is challenging as she is anxious and expresses that she is embarrassed.  She states she has a history of conization of the cervix.  No issues after this was done.  See below for path.      Duration of symptoms: > 4 years     She is bothered by the prolapse because it is beyond the lips of the vagina and is uncomfortable.  She does not splint to urinate or defecate.  T    Pad use:  unknown pads, she gets pull ups from her insurance.       Previous therapies:  offered a pessary but she declined     Activities the incontinence prevents:  sounds like she does not like to go out of the house due to the prolapse     Gross hematuria:  none     Recurrent UTI:  none     , uterus present, not sexually active (no partner), the bowels are normal.         REVIEW OF SYSTEMS:  Review of Systems   Constitutional:  Negative for chills and fever.   HENT:  Negative for congestion and sore throat.    Respiratory:  Negative for cough and shortness of breath.    Cardiovascular:  Negative for chest pain and palpitations.   Gastrointestinal:  Negative for nausea and vomiting.   Genitourinary:  Positive for frequency and urgency. Negative for dysuria, flank pain and  hematuria.   Neurological:  Negative for dizziness and headaches.         PATIENT HISTORY:    Past Medical History:   Diagnosis Date    Diabetes mellitus     Hypertension        Past Surgical History:   Procedure Laterality Date    COLD KNIFE CONIZATION OF CERVIX N/A 6/4/2019    Procedure: CONE BIOPSY, CERVIX, USING COLD KNIFE;  Surgeon: Sapphire Jones MD;  Location: [facility];  Service: OB/GYN;  Laterality: N/A;    spinal fusions      TONSILLECTOMY      TUBAL LIGATION         Family History   Problem Relation Age of Onset    Heart attack Father     Diabetes Mother     Breast cancer Neg Hx     Cancer Neg Hx     Ovarian cancer Neg Hx     Vaginal cancer Neg Hx     Endometrial cancer Neg Hx     Cervical cancer Neg Hx        Social History     Socioeconomic History    Marital status: Single   Tobacco Use    Smoking status: Some Days     Current packs/day: 0.50     Types: Cigarettes    Smokeless tobacco: Never   Substance and Sexual Activity    Alcohol use: No    Drug use: No    Sexual activity: Not Currently   Social History Narrative    ** Merged History Encounter **         ** Merged History Encounter **         ** Merged History Encounter **            Allergies:  Codeine and Penicillins    Medications:    Current Outpatient Medications:     amLODIPine (NORVASC) 10 MG tablet, Take 10 mg by mouth once daily., Disp: , Rfl:     calcium carbonate (OS-PAL) 600 mg calcium (1,500 mg) Tab, Take 1 tablet by mouth once daily., Disp: , Rfl: 3    ibuprofen (ADVIL,MOTRIN) 600 MG tablet, Take 1 tablet (600 mg total) by mouth every 6 (six) hours as needed for Pain., Disp: 30 tablet, Rfl: 0    latanoprost 0.005 % ophthalmic solution, Place into both eyes., Disp: , Rfl:     metFORMIN (GLUCOPHAGE) 500 MG tablet, Take 500 mg by mouth daily with breakfast., Disp: , Rfl: 3    mupirocin (BACTROBAN) 2 % ointment, Apply topically 3 (three) times daily., Disp: 30 g, Rfl: 0    triamcinolone acetonide 0.1% (KENALOG) 0.1 % cream, APPLY TWICE A  DAY AS NEEDED FOR ITCHY RAISED SKIN. DON'T USE ON FACE, ARMPITS, OR GROIN., Disp: , Rfl: 2    VITAMIN D2 50,000 unit capsule, TAKE 1 CAPSULE WEEKLY FOR VITAMIN D REPLACMENT THERAPY (Patient not taking: Reported on 4/5/2024), Disp: , Rfl: 3    PHYSICAL EXAMINATION:  Physical Exam  HENT:      Head: Normocephalic and atraumatic.      Right Ear: External ear normal.      Left Ear: External ear normal.      Nose: Nose normal.      Mouth/Throat:      Mouth: Mucous membranes are moist.   Genitourinary:     Uterus: With uterine prolapse.       Comments: Nurse Nora was able to successfully catheterize the patient. Urethra was very difficult to pass catheter through initially. Cath PVR 80 ml.   Skin:     General: Skin is warm and dry.   Neurological:      General: No focal deficit present.      Mental Status: She is alert and oriented to person, place, and time.   Psychiatric:         Mood and Affect: Mood normal.         Behavior: Behavior normal.           Lab Results   Component Value Date    CREATININE 0.9 01/08/2024    EGFRNORACEVR >60.0 01/08/2024               IMPRESSION:    Encounter Diagnoses   Name Primary?    Procidentia of uterus Yes    Urge incontinence     Abnormal urinalysis          Assessment:       1. Procidentia of uterus    2. Urge incontinence    3. Abnormal urinalysis        Plan:   - Cath urine sent for culture. Will call patient with result and treat prior to SUDS/cysto.     RTC 4/12/24 for scheduled procedure     I spent 30 minutes with the patient of which more than half was spent in direct consultation with the patient in regards to our treatment and plan.  We addressed the office findings and recent labs; any need to go ER today.   Education and recommendations of today's plan of care including home remedies and needed follow up with PCP.   We discussed the chief complaints; reviewed the LUTS and the possible contributory factors.

## 2024-04-06 LAB — BACTERIA UR CULT: NO GROWTH

## 2024-04-08 DIAGNOSIS — N81.3 PROCIDENTIA OF UTERUS: Primary | ICD-10-CM

## 2024-04-08 DIAGNOSIS — N30.90 BLADDER INFECTION: ICD-10-CM

## 2024-04-08 RX ORDER — LEVOFLOXACIN 500 MG/1
500 TABLET, FILM COATED ORAL DAILY
Qty: 3 TABLET | Refills: 0 | Status: SHIPPED | OUTPATIENT
Start: 2024-04-08 | End: 2024-04-11

## 2024-04-10 ENCOUNTER — TELEPHONE (OUTPATIENT)
Dept: UROLOGY | Facility: HOSPITAL | Age: 74
End: 2024-04-10
Payer: MEDICARE

## 2024-04-10 DIAGNOSIS — R82.90 ABNORMAL URINALYSIS: ICD-10-CM

## 2024-04-10 DIAGNOSIS — N30.90 BLADDER INFECTION: ICD-10-CM

## 2024-04-10 DIAGNOSIS — N81.3 PROCIDENTIA OF UTERUS: Primary | ICD-10-CM

## 2024-04-10 RX ORDER — SULFAMETHOXAZOLE AND TRIMETHOPRIM 800; 160 MG/1; MG/1
1 TABLET ORAL 2 TIMES DAILY
Qty: 6 TABLET | Refills: 0 | Status: SHIPPED | OUTPATIENT
Start: 2024-04-10 | End: 2024-04-13

## 2024-04-10 NOTE — TELEPHONE ENCOUNTER
----- Message from Maricel Delgado LPN sent at 4/8/2024  9:24 AM CDT -----  Regarding: FW: advise  Contact: [phone]  Please advise. Pt r/s for UDS on 4/12/24. Most recent ucx is negative, however, would you like to give something in preparation for procedure on Friday, 4/12/24?  ----- Message -----  From: Chen Chahal LPN  Sent: 4/5/2024   4:47 PM CDT  To: Maricel Delgado LPN  Subject: FW: advise                                         ----- Message -----  From: Lucie Oro  Sent: 4/5/2024   3:26 PM CDT  To: Isaiah LUEVANO Staff  Subject: advise                                           KURT CODY calling regarding Patient Advice (message) about if any medication was called in due to labs and it Dr. mondragon called in meds yet for her procedure 4/12/24

## 2024-04-10 NOTE — TELEPHONE ENCOUNTER
States she received msg from clinic and a notice from [facility]. She has already taken one pill of a antibiotic that was sent on yesterday (4/9). Pt given Levaquin by NP. Notified Dr. Lambert, she prefers pt to take Bactrim since there is no coverage for previous bacteria on ucx (Coag neg Stah). Pt v/u.

## 2024-04-12 ENCOUNTER — PROCEDURE VISIT (OUTPATIENT)
Dept: UROLOGY | Facility: CLINIC | Age: 74
End: 2024-04-12
Payer: MEDICARE

## 2024-04-12 VITALS
TEMPERATURE: 98 F | SYSTOLIC BLOOD PRESSURE: 132 MMHG | BODY MASS INDEX: 27.11 KG/M2 | HEIGHT: 63 IN | WEIGHT: 153 LBS | DIASTOLIC BLOOD PRESSURE: 76 MMHG | HEART RATE: 107 BPM

## 2024-04-12 DIAGNOSIS — N39.41 URGE INCONTINENCE: ICD-10-CM

## 2024-04-12 DIAGNOSIS — N81.3 PROCIDENTIA OF UTERUS: ICD-10-CM

## 2024-04-12 DIAGNOSIS — R87.619 ABNORMAL CERVICAL PAPANICOLAOU SMEAR, UNSPECIFIED ABNORMAL PAP FINDING: Primary | ICD-10-CM

## 2024-04-12 PROCEDURE — 51784 ANAL/URINARY MUSCLE STUDY: CPT | Mod: 26,51,S$GLB, | Performed by: UROLOGY

## 2024-04-12 PROCEDURE — 51741 ELECTRO-UROFLOWMETRY FIRST: CPT | Mod: 26,51,S$GLB, | Performed by: UROLOGY

## 2024-04-12 PROCEDURE — 52000 CYSTOURETHROSCOPY: CPT | Mod: 59,S$GLB,, | Performed by: UROLOGY

## 2024-04-12 PROCEDURE — 51797 INTRAABDOMINAL PRESSURE TEST: CPT | Mod: 26,S$GLB,, | Performed by: UROLOGY

## 2024-04-12 PROCEDURE — 51728 CYSTOMETROGRAM W/VP: CPT | Mod: 26,S$GLB,, | Performed by: UROLOGY

## 2024-04-12 RX ORDER — DOXYCYCLINE HYCLATE 100 MG
100 TABLET ORAL ONCE
Status: COMPLETED | OUTPATIENT
Start: 2024-04-12 | End: 2024-04-12

## 2024-04-12 RX ORDER — LIDOCAINE HYDROCHLORIDE 20 MG/ML
JELLY TOPICAL ONCE
Status: COMPLETED | OUTPATIENT
Start: 2024-04-12 | End: 2024-04-12

## 2024-04-12 RX ADMIN — Medication 100 MG: at 05:04

## 2024-04-12 RX ADMIN — LIDOCAINE HYDROCHLORIDE: 20 JELLY TOPICAL at 05:04

## 2024-04-12 NOTE — PATIENT INSTRUCTIONS
_                                                                                                                                                                                             If any problems after hours or weekends, you may call [phone] and ask for the urology resident on call.SIMPLE URODYNAMIC STUDY (SUDS) & CYSTOSCOPY  UROLOGY CLINIC DISCHARGE INSTRUCTIONS    You have had a procedure that will require time to properly heal. Follow the instructions you have been given on how to care for yourself once you are home. Below is additional information to help in your recovery.    ACTIVITY  There are no restrictions in activity. Start doing again the things you did before the procedure.  You may experience a slight burning sensation. You may notice a small amount of blood in your urine. This will clear up within a day. Call the clinic if this continues beyond 48 hours.    DIET  Continue your normal diet. You may eat the same foods you ate before your procedure.  Drink plenty of fluids during the first 24-48 hours following your procedure.    MEDICATIONS  Resume all other previous medications from your prescribing physician.  Continue any pre=procedure antibiotics until they are all gone.    SIGNS AND SYMPTOMS TO REPORT TO THE DOCTOR  Chills or fever greater than 101° F within 24 hours of procedure.  Changes in urination, such as increased bleeding, foul smell, cloudy urine, or painful urination.  Call your doctor with any questions or concerns.    For any emergency situation, call 911 immediately or go to your nearest emergency room.    [facility]  [phone]

## 2024-04-15 NOTE — PROCEDURES
Procedures    Urodynamic Report    Indication:  myrna    Patient was taken to the Urodynamic Suite where a time out was performed. She was asked to perform a free uroflow.  Next, the patient was prepped and the urinary residual was drained with a 14 Fr catheter.  A 7 Fr dual lumen catheter was placed to measure intravesical pressures.  A 10 Fr balloon manometer was placed into the rectum for abdominal pressure measurements.  Patch EMG electrodes were placed on the perineum.  The patient was connected to the Trac[employer] Urodynamic machine, using a multichannel technique, the data were interpreted.  The bladder was filled with sterile water at room temperature at a rate of 30 ml/min.  Patient is filled to urgency.  Filling is performed with the patient in the seated position.  Abdominal leak point pressures are checked at 1st desire, then serially at 50cc increments first with Valsalva then with coughing.  The patient was then asked to sit and void for a pressure flow study.    The following are the results of the study:  1.  Uroflow       Q max:  5.6 ml/sec       Voided volume:  66 ml       Pattern of the curve:  intermittent    2.  PVR:  150 ml    3.  CMG       Sensation:         First Desire:  205 ml         Normal Desire:  240 ml         Strong Desire:  265 ml         Urgency:  460 ml       Capacity:  500 ml       Abnormal Contractions:  none       Compliance:  normal    4.  Abdominal Leak Point Pressure:  no stress incontinence noted reducing the prolapse manually and with a full speculum    5.  EMG:  decreased with cough,     6.  Voiding phase  unable to empty (could not reduce her prolapse with a vaginal pack as it is quite large (there is loss of the lateral supports as well as the apical supports.        Voided volume:  none       PVR:  500 ml    7.  Analysis:   myrna    8.  Recommendations:       a.  Will have her be re evaluated by OBGYN to be assured no further testing needed. There was a  history of an abnormal PAP test       b.  Would plan a LeForte colpocleisis as she is not interested in being sexually active in the future.  She is presently not sexually active           CYSTOSCOPY REPORT    Pre Procedure Diagnosis:  procidentia    Post Procedure Diagnosis:  cystocele is just behind the bladder neck.      Anesthesia: 10 cc 2% lidocaine jelly applied per urethra.    14 FR Flexible Olympus cystoscope used.    FINDINGS:  Dome, anterior, posterior, lateral walls and bladder base free of urothelial abnormalities. Right and left ureteral orifices in the normal postion and configuration, both effluxed clear urine.  Bladder neck and urethra were normal.    Specimen:  none    The patient was taken to the cystoscopy suite and placed in dorsal lithotomy position.  The genitalia was prepped and draped  in the usual sterile fashion.  Time out was performed.  Two percent lidocaine jelly was inserted in the urethra.  After sufficent time had passed to allow good local anesthesia, the cystoscope was inserted in the urethra and passed into the bladder visualizing the urethra along its entire course.  The dome, anterior, posterior and lateral walls were examined systematically.  The ureteral orifices were in their usual position and configuration.  The cystoscope was turned upon itself 180 degrees to visualize the bladder neck.  The cystoscope was then brought to the level of the bladder neck, the water was turned on and the urethra was visualized.  The cystoscope was removed and the patient was instructed to urinate prior to leaving the office.     Post procedure medication:  doxycycline 100 mg x 1     ASSESSMENT/PLAN:  73 year old woman status post flexible cystoscopy.  1. Push fluids for 24 hours.  2. May see blood in the urine, this should gradually improve over the next 2-3 days.  3. The patient was instructed to return to the office or go to the emergency should fever, chills, cloudy urine, or inability to  urinate develop.  4. Follow up as above.

## 2024-04-16 ENCOUNTER — TELEPHONE (OUTPATIENT)
Dept: OBSTETRICS AND GYNECOLOGY | Facility: CLINIC | Age: 74
End: 2024-04-16
Payer: MEDICARE

## 2024-04-16 DIAGNOSIS — N95.0 PMB (POSTMENOPAUSAL BLEEDING): Primary | ICD-10-CM

## 2024-04-16 NOTE — TELEPHONE ENCOUNTER
----- Message from Jyoti Lambert MD sent at 4/15/2024  5:01 PM CDT -----  Hello!      I have a favor to ask.  Would you be willing to see this patient?  She has a history of an abnormal PAP and I believe she had post menopausal bleeding that was cleared in the past.  She has procidentia and I would like to do a Leforte colpocleisis but want to be assured that I can move forward, leaving the uterus in place.  Do you mind clearing her?  Let me know if there is someone else you'd rather me send her if that is what you would rather do.      Hope all is well.    Jyoti

## 2024-04-16 NOTE — TELEPHONE ENCOUNTER
Hey! Yes, I would be happy to! Thank you! How soon would you like me to see her?    @Leonardo, please schedule a TVUS so I can evaluate the endometrial stripe and schedule visit with me.

## 2024-04-16 NOTE — TELEPHONE ENCOUNTER
----- Message from Holli Canales sent at 4/16/2024  3:03 PM CDT -----  Type:  Patient Returning Call    Who Called:pt  Who Left Message for Patient:office  Does the patient know what this is regarding?:scheduling consult  Would the patient rather a call back or a response via [facility]? call  Best Call Back Number:[phone]   Additional Information:

## 2024-04-17 NOTE — TELEPHONE ENCOUNTER
----- Message from Holli Moncada sent at 4/17/2024  8:33 AM CDT -----  Needs advice from nurse:      Who Called:pt  Regarding:would like to know if she needs to see the doctor in addition to having the US on Monday  Would the patient rather a call back or VIA [facility]?  Best Call Back number:[phone]  Additional Info:

## 2024-04-19 NOTE — TELEPHONE ENCOUNTER
Pt did call back , I got her booked for US in [facility] 4/22 at 1:15pm.  Consult is 6/13 @3:45 just waiting for them to move her into the slot.

## 2024-05-07 ENCOUNTER — TELEPHONE (OUTPATIENT)
Dept: OBSTETRICS AND GYNECOLOGY | Facility: CLINIC | Age: 74
End: 2024-05-07
Payer: MEDICARE

## 2024-05-07 NOTE — TELEPHONE ENCOUNTER
----- Message from Jay Jay Santos sent at 5/6/2024  8:05 AM CDT -----  .Type:  Needs Medical Advice    Who Called: pt    Would the patient rather a call back or a response via [facility]? Call back  Best Call Back Number: [phone]  Additional Information:     Pt stated she would like a call back for the results from her test

## 2024-05-07 NOTE — TELEPHONE ENCOUNTER
----- Message from Masood Wilkes sent at 5/7/2024 10:10 AM CDT -----  Contact: Pt  .Type:  Test Results    Who Called: pt  Name of Test (Lab/Mammo/Etc):  Ultrasound  Date of Test: 04/22/2024  Ordering Provider:   Where the test was performed: [facility]  Would the patient rather a call back or a response via [facility]?  Call back  Best Call Back Number: [phone]   Additional Information:  Pt.  is returning a call back to the office regarding her test results.  
Spoke with pt.    Pt was confused about which appointment she should keep for her upcoming visit. Pt has an appt with Dr Marcano and . Pt said she doesn't know who scheduled her appt with . I took a look and it only shows the phone staff. I offered to cancel her appt with  as  was the one who ordered the U/S. Pt said she will get in touch with her daughter and will call back with what they decide to do.         
.

## 2024-05-07 NOTE — TELEPHONE ENCOUNTER
----- Message from Luisito Perkins sent at 5/7/2024 10:50 AM CDT -----  Pt Requesting Call Back    Who called: pt  Who called for pt:  Best call back #:  [phone]  Add notes: pt said she wants to know did her daughter have her 5/22/24 appt scheduled (says Dr. Baker told her to find this information out)

## 2024-05-09 ENCOUNTER — TELEPHONE (OUTPATIENT)
Dept: UROLOGY | Facility: CLINIC | Age: 74
End: 2024-05-09
Payer: MEDICARE

## 2024-05-09 NOTE — TELEPHONE ENCOUNTER
----- Message from Jyoti Lambert MD sent at 5/8/2024  7:54 PM CDT -----  Regarding: RE: Results  Contact: [phone]  This was an ultrasound for the OBGYN.  I see it in the chart but she still needs to keep the appointment with the OBGYN for clearance so we can move forward with the colpocleisis.  Thanks.  ----- Message -----  From: Maricel Delgado LPN  Sent: 5/6/2024   3:58 PM CDT  To: Jyoti Lambert MD  Subject: FW: Results                                        ----- Message -----  From: Zulay Palafox  Sent: 5/6/2024   3:50 PM CDT  To: Petra Montes Staff  Subject: Results                                          Patient is calling stating she had a ultrasound test taken at [facility] and she wants to know if provider received the results. Please give pt a call to confirm if received.

## 2024-05-20 ENCOUNTER — TELEPHONE (OUTPATIENT)
Dept: OBSTETRICS AND GYNECOLOGY | Facility: CLINIC | Age: 74
End: 2024-05-20
Payer: MEDICARE

## 2024-05-20 NOTE — TELEPHONE ENCOUNTER
----- Message from Holli Canales sent at 5/20/2024  9:55 AM CDT -----  Type:  Needs Medical Advice    Who Called: pt3  Symptoms (please be specific): pt needs a call back to verify that her appt  is still good for 5/22 for Jason, the ride group with [employer] to make sure it was put  that she does have the appt on 5/22 please call pt back    Would the patient rather a call back or a response via [facility]? call  Best Call Back Number:    [phone]     Additional Information:  for her medical clearance

## 2024-05-22 ENCOUNTER — OFFICE VISIT (OUTPATIENT)
Dept: OBSTETRICS AND GYNECOLOGY | Facility: CLINIC | Age: 74
End: 2024-05-22
Payer: MEDICARE

## 2024-05-22 VITALS
WEIGHT: 158.63 LBS | BODY MASS INDEX: 28.09 KG/M2 | DIASTOLIC BLOOD PRESSURE: 106 MMHG | SYSTOLIC BLOOD PRESSURE: 167 MMHG

## 2024-05-22 DIAGNOSIS — N81.3 PROCIDENTIA OF UTERUS: ICD-10-CM

## 2024-05-22 DIAGNOSIS — R87.619 ABNORMAL CERVICAL PAPANICOLAOU SMEAR, UNSPECIFIED ABNORMAL PAP FINDING: ICD-10-CM

## 2024-05-22 PROCEDURE — 88175 CYTOPATH C/V AUTO FLUID REDO: CPT | Performed by: STUDENT IN AN ORGANIZED HEALTH CARE EDUCATION/TRAINING PROGRAM

## 2024-05-22 PROCEDURE — 1101F PT FALLS ASSESS-DOCD LE1/YR: CPT | Mod: CPTII,S$GLB,, | Performed by: STUDENT IN AN ORGANIZED HEALTH CARE EDUCATION/TRAINING PROGRAM

## 2024-05-22 PROCEDURE — 87624 HPV HI-RISK TYP POOLED RSLT: CPT | Performed by: STUDENT IN AN ORGANIZED HEALTH CARE EDUCATION/TRAINING PROGRAM

## 2024-05-22 PROCEDURE — 3288F FALL RISK ASSESSMENT DOCD: CPT | Mod: CPTII,S$GLB,, | Performed by: STUDENT IN AN ORGANIZED HEALTH CARE EDUCATION/TRAINING PROGRAM

## 2024-05-22 PROCEDURE — 3080F DIAST BP >= 90 MM HG: CPT | Mod: CPTII,S$GLB,, | Performed by: STUDENT IN AN ORGANIZED HEALTH CARE EDUCATION/TRAINING PROGRAM

## 2024-05-22 PROCEDURE — 3008F BODY MASS INDEX DOCD: CPT | Mod: CPTII,S$GLB,, | Performed by: STUDENT IN AN ORGANIZED HEALTH CARE EDUCATION/TRAINING PROGRAM

## 2024-05-22 PROCEDURE — 3077F SYST BP >= 140 MM HG: CPT | Mod: CPTII,S$GLB,, | Performed by: STUDENT IN AN ORGANIZED HEALTH CARE EDUCATION/TRAINING PROGRAM

## 2024-05-22 PROCEDURE — 1126F AMNT PAIN NOTED NONE PRSNT: CPT | Mod: CPTII,S$GLB,, | Performed by: STUDENT IN AN ORGANIZED HEALTH CARE EDUCATION/TRAINING PROGRAM

## 2024-05-22 PROCEDURE — 1159F MED LIST DOCD IN RCRD: CPT | Mod: CPTII,S$GLB,, | Performed by: STUDENT IN AN ORGANIZED HEALTH CARE EDUCATION/TRAINING PROGRAM

## 2024-05-22 PROCEDURE — 99203 OFFICE O/P NEW LOW 30 MIN: CPT | Mod: S$GLB,,, | Performed by: STUDENT IN AN ORGANIZED HEALTH CARE EDUCATION/TRAINING PROGRAM

## 2024-05-22 PROCEDURE — 99999 PR PBB SHADOW E&M-EST. PATIENT-LVL III: CPT | Mod: PBBFAC,,, | Performed by: STUDENT IN AN ORGANIZED HEALTH CARE EDUCATION/TRAINING PROGRAM

## 2024-05-22 PROCEDURE — 87625 HPV TYPES 16 & 18 ONLY: CPT | Mod: 59 | Performed by: STUDENT IN AN ORGANIZED HEALTH CARE EDUCATION/TRAINING PROGRAM

## 2024-05-22 NOTE — PROGRESS NOTES
CC: Vaginal Prolapse    HPI:  Nia Ames is a 73 y.o. female  presents with complaint of prolapse. Patient follows with Dr. 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 She had LEEP in 2019, no dysplasia in LEEP sample and no dysplasia in ECC.     She reports no post menopausal bleeding, feels pain/pressure from the prolapse.     ROS:  GENERAL: No fever, chills, fatigability or weight loss.  VULVAR: No pain, no lesions and no itching.  VAGINAL: see HPI  ABDOMEN: No abdominal pain. Denies nausea. Denies vomiting. No diarrhea. No constipation  BREAST: Denies pain. No lumps. No discharge.  URINARY: No incontinence, no nocturia, no frequency and no dysuria.  CARDIOVASCULAR: No chest pain. No shortness of breath. No leg cramps.  NEUROLOGICAL: No headaches. No vision changes.      Patient History:  Past Medical History:   Diagnosis Date    Diabetes mellitus     Hypertension      Past Surgical History:   Procedure Laterality Date    COLD KNIFE CONIZATION OF CERVIX N/A 2019    Procedure: CONE BIOPSY, CERVIX, USING COLD KNIFE;  Surgeon: Sapphire Jones MD;  Location: [facility];  Service: OB/GYN;  Laterality: N/A;    spinal fusions      TONSILLECTOMY      TUBAL LIGATION       Social History     Tobacco Use    Smoking status: Some Days     Current packs/day: 0.50     Types: Cigarettes    Smokeless tobacco: Never   Substance Use Topics    Alcohol use: No    Drug use: No     Family History   Problem Relation Name Age of Onset    Heart attack Father      Diabetes Mother      Breast cancer Neg Hx      Cancer Neg Hx      Ovarian cancer Neg Hx      Vaginal cancer Neg Hx      Endometrial cancer Neg Hx      Cervical cancer Neg Hx       OB History    Para Term  AB Living   6 4 4 0 2     SAB IAB Ectopic Multiple Live Births   2 0 0          # Outcome Date GA Lbr Pepe/2nd Weight Sex Type Anes PTL Lv   6 SAB            5 SAB            4 Term            3 Term             2 Term            1 Term                Objective:   BP (!) 167/106   Wt 71.9 kg (158 lb 9.6 oz)   LMP  (LMP Unknown)   BMI 28.09 kg/m²   No LMP recorded (lmp unknown). Patient is postmenopausal.      PHYSICAL EXAM:  APPEARANCE: Well nourished, well developed, in no acute distress.  AFFECT: WNL, alert and oriented x 3  SKIN: No acne or hirsutism  NECK: Neck symmetric without masses or thyromegaly  CHEST: Good respiratory effect  PELVIC: Normal external genitalia without lesions.  Normal hair distribution.  Complete prolapse of cervix and vaginal wall, dry skin noted, no bleeding or lesions.  EXTREMITIES: No edema.      ASSESSMENT and PLAN:    ICD-10-CM ICD-9-CM    1. Procidentia of uterus  N81.3 618.3 Ambulatory referral/consult to Obstetrics / Gynecology      2. Abnormal cervical Papanicolaou smear, unspecified abnormal pap finding  R87.619 795.00 Ambulatory referral/consult to Obstetrics / Gynecology      Liquid-Based Pap Smear, Screening      HPV High Risk Genotypes, PCR          Hx abnormal pap smear, planned colpocleisis  - US 4/22/24 endometrium 3.3 mm, no history of PMB, patient will not need sampling of uterine lining at this time  - pap and HPV collected today due to abnormal pap/CIN3 and LEEP in 2019  - discussed colpo as needed, if CIN2-3 present patient can consider hysterectomy at time of surgery      Follow up: as needed        Shayla Marcano MD  OBGYN [facility]

## 2024-05-30 LAB
CLINICAL INFO: ABNORMAL
CYTO CVX: ABNORMAL
CYTOLOGIST CVX/VAG CYTO: ABNORMAL
CYTOLOGIST CVX/VAG CYTO: ABNORMAL
CYTOLOGY CMNT CVX/VAG CYTO-IMP: ABNORMAL
CYTOLOGY PAP THIN PREP EXPLANATION: ABNORMAL
DATE OF PREVIOUS PAP: ABNORMAL
DATE PREVIOUS BX: NO
GEN CATEG CVX/VAG CYTO-IMP: ABNORMAL
HPV I/H RISK 4 DNA CVX QL NAA+PROBE: DETECTED
HPV16 DNA CVX QL PROBE+SIG AMP: DETECTED
HPV18 DNA CVX QL PROBE+SIG AMP: NOT DETECTED
LMP START DATE: ABNORMAL
MICROORGANISM CVX/VAG CYTO: ABNORMAL
PATHOLOGIST CVX/VAG CYTO: ABNORMAL
SERVICE CMNT-IMP: ABNORMAL
SPECIMEN SOURCE CVX/VAG CYTO: ABNORMAL
STAT OF ADQ CVX/VAG CYTO-IMP: ABNORMAL

## 2024-06-01 ENCOUNTER — PATIENT MESSAGE (OUTPATIENT)
Dept: OBSTETRICS AND GYNECOLOGY | Facility: CLINIC | Age: 74
End: 2024-06-01
Payer: MEDICARE

## 2024-06-04 ENCOUNTER — TELEPHONE (OUTPATIENT)
Dept: OBSTETRICS AND GYNECOLOGY | Facility: CLINIC | Age: 74
End: 2024-06-04
Payer: MEDICARE

## 2024-06-04 NOTE — TELEPHONE ENCOUNTER
----- Message from Liliana Reardon sent at 6/3/2024  4:42 PM CDT -----  Type:  Needs Medical Advice/results     Who Called: pt    Would the patient rather a call back or a response via [facility]? Call   Best Call Back Number: [phone]   Additional Information: pt requesting a call back to discuss results from pap.

## 2024-06-06 ENCOUNTER — TELEPHONE (OUTPATIENT)
Dept: OBSTETRICS AND GYNECOLOGY | Facility: CLINIC | Age: 74
End: 2024-06-06
Payer: MEDICARE

## 2024-06-06 NOTE — TELEPHONE ENCOUNTER
----- Message from Vanessa Villareal sent at 6/6/2024 12:37 PM CDT -----  Regarding: call back  Contact: [phone]  Type:  Patient Returning Call    Who Called: PT   Who Left Message for Patient: Nurse   Does the patient know what this is regarding?: Yes   Would the patient rather a call back or a response via [facility]? Call back   Best Call Back Number: [phone]   Additional Information:

## 2024-06-06 NOTE — TELEPHONE ENCOUNTER
----- Message from Vanessa Villareal sent at 6/5/2024  3:22 PM CDT -----  Regarding: Call back  Contact: [phone]  Type:  Patient Returning Call    Who Called: PT   Who Left Message for Patient: Nurse   Does the patient know what this is regarding?: Yes   Would the patient rather a call back or a response via [facility]? Call back   Best Call Back Number: [phone]   Additional Information:

## 2024-06-12 ENCOUNTER — PROCEDURE VISIT (OUTPATIENT)
Facility: CLINIC | Age: 74
End: 2024-06-12
Payer: MEDICARE

## 2024-06-12 VITALS — WEIGHT: 159.38 LBS | BODY MASS INDEX: 28.24 KG/M2

## 2024-06-12 DIAGNOSIS — R87.610 ASCUS WITH POSITIVE HIGH RISK HPV CERVICAL: Primary | ICD-10-CM

## 2024-06-12 DIAGNOSIS — R87.810 ASCUS WITH POSITIVE HIGH RISK HPV CERVICAL: Primary | ICD-10-CM

## 2024-06-12 PROCEDURE — 88360 TUMOR IMMUNOHISTOCHEM/MANUAL: CPT | Performed by: PATHOLOGY

## 2024-06-12 PROCEDURE — 88342 IMHCHEM/IMCYTCHM 1ST ANTB: CPT | Mod: 26,XU,, | Performed by: PATHOLOGY

## 2024-06-12 PROCEDURE — 88342 IMHCHEM/IMCYTCHM 1ST ANTB: CPT | Performed by: PATHOLOGY

## 2024-06-12 PROCEDURE — 88305 TISSUE EXAM BY PATHOLOGIST: CPT | Mod: 26,,, | Performed by: PATHOLOGY

## 2024-06-12 PROCEDURE — 88360 TUMOR IMMUNOHISTOCHEM/MANUAL: CPT | Mod: 26,,, | Performed by: PATHOLOGY

## 2024-06-12 PROCEDURE — 99499 UNLISTED E&M SERVICE: CPT | Mod: S$GLB,,, | Performed by: STUDENT IN AN ORGANIZED HEALTH CARE EDUCATION/TRAINING PROGRAM

## 2024-06-12 PROCEDURE — 88305 TISSUE EXAM BY PATHOLOGIST: CPT | Mod: 59 | Performed by: PATHOLOGY

## 2024-06-12 PROCEDURE — 57454 BX/CURETT OF CERVIX W/SCOPE: CPT | Mod: S$GLB,,, | Performed by: STUDENT IN AN ORGANIZED HEALTH CARE EDUCATION/TRAINING PROGRAM

## 2024-06-12 NOTE — PROCEDURES
Colposcopy    Date/Time: 6/12/2024 2:30 PM    Performed by: Shayla Marcano MD  Authorized by: Shayla Marcano MD    Consent obatined:  Prior to procedure the appropriate consent was completed and verified    Colposcopy Site:  Cervix  Position:  Supine  Acrowhite Lesion? Yes    Atypical Vessels: No    Transformation Zone Adequate?: No    Biopsy?: Yes         Location:  Cervix ((12 00))  ECC Performed?: Yes    LEEP Performed?: No    Estimated blood loss (cc):  0   Patient tolerated the procedure well with no immediate complications.   Post-operative instructions were provided for the patient.   Patient was discharged and will follow up if any complications occur

## 2024-06-12 NOTE — PROGRESS NOTES
CC: abnormal pap, here for colposcopy     HPI:  Nia Ames is a 73 y.o. female   presents for colposcopy.  No LMP recorded (lmp unknown). Patient is postmenopausal..  Her most recent pap smear shows ASCUS with POSITIVE high risk HPV.      History:   2019 pap neg HPV 16+  Colpo with CIN1 on biopsy and CIN2-3 on ECC  LEEP mild HPV effect no residual dysplasia, ECC no dysplasia       ROS:  GENERAL: Denies weight gain or weight loss. Feeling well overall.   SKIN: Denies rash or lesions.   HEAD: Denies head injury or headache.   NODES: Denies enlarged lymph nodes.   CHEST: Denies chest pain or shortness of breath.   CARDIOVASCULAR: Denies palpitations or left sided chest pain.   ABDOMEN: No abdominal pain, constipation, diarrhea, nausea, vomiting or rectal bleeding.   URINARY: No frequency, dysuria, hematuria, or burning on urination.  REPRODUCTIVE: See HPI.   BREASTS: The patient performs breast self-examination and denies pain, lumps, or nipple discharge.   HEMATOLOGIC: No easy bruisability or excessive bleeding.  MUSCULOSKELETAL: Denies joint pain or swelling.   NEUROLOGIC: Denies syncope or weakness.   PSYCHIATRIC: Denies depression, anxiety or mood swings.    Objective:   Wt 72.3 kg (159 lb 6.3 oz)   LMP  (LMP Unknown)   BMI 28.24 kg/m²       Physical Exam:  APPEARANCE: Well nourished, well developed, in no acute distress.  AFFECT: WNL, alert and oriented x 3  PELVIC: Normal external genitalia without lesions.  Complete prolapse, cervix dry. Colpo with biopsy, ECC completed today       Assessment and Plan:    Abnormal pap smear  - The abnormal test findings and HPV infection were discussed. Patient counseled on the need for colposcopy and possible biopsies/ECC to determine further indicated treatments. Discussed minimal risk of bleeding and infection with colposcopy, and alternatives to colposcopy and patient agreed to proceed.    - Post-colposcopy counseling completed including: manage  post-colposcopy cramping with NSAIDs or Tylenol.  Avoid intercourse, douching, or tampons in the vagina for at least 2-3 days.  Expect a clumpy blackish discharge due to Monsel's solution application for several days.  Report heavy bleeding, worsening pain or pain that does not respond to above medications, or foul-smelling vaginal discharge.   - Follow up pending colposcopy results.      Stephanie Heaney, MD OBGYN [facility]

## 2024-06-17 LAB
FINAL PATHOLOGIC DIAGNOSIS: NORMAL
GROSS: NORMAL
Lab: NORMAL
MICROSCOPIC EXAM: NORMAL

## 2024-06-24 ENCOUNTER — TELEPHONE (OUTPATIENT)
Dept: UROLOGY | Facility: CLINIC | Age: 74
End: 2024-06-24
Payer: MEDICARE

## 2024-06-24 NOTE — TELEPHONE ENCOUNTER
----- Message from Mei Almaraz LPN sent at 6/24/2024  3:57 PM CDT -----  Contact: [phone]    ----- Message -----  From: Becca Mata  Sent: 6/24/2024   1:01 PM CDT  To: Petra Montes Staff    The patient would like a call back at your earliest convince.about setting surgery up   The pt can be reached at [phone]

## 2024-06-25 ENCOUNTER — TELEPHONE (OUTPATIENT)
Dept: OBSTETRICS AND GYNECOLOGY | Facility: CLINIC | Age: 74
End: 2024-06-25

## 2024-06-25 NOTE — TELEPHONE ENCOUNTER
----- Message from Alyssa Nielsen sent at 6/24/2024  4:57 PM CDT -----  Type:  Test Results    Who Called: pt   Name of Test (Lab/Mammo/Etc): Biopsy   Date of Test: 06/12  Ordering Provider: Krys   Where the test was performed: [facility]   Would the patient rather a call back or a response via [facility]? Call   Best Call Back Number: [phone]  Additional Information:

## 2024-07-01 ENCOUNTER — TELEPHONE (OUTPATIENT)
Dept: UROLOGY | Facility: CLINIC | Age: 74
End: 2024-07-01
Payer: MEDICARE

## 2024-07-01 ENCOUNTER — OFFICE VISIT (OUTPATIENT)
Dept: UROLOGY | Facility: CLINIC | Age: 74
End: 2024-07-01
Payer: MEDICARE

## 2024-07-01 VITALS
SYSTOLIC BLOOD PRESSURE: 155 MMHG | BODY MASS INDEX: 28.44 KG/M2 | WEIGHT: 160.5 LBS | HEART RATE: 103 BPM | DIASTOLIC BLOOD PRESSURE: 97 MMHG | HEIGHT: 63 IN

## 2024-07-01 DIAGNOSIS — Z01.818 PRE-OP TESTING: Primary | ICD-10-CM

## 2024-07-01 DIAGNOSIS — N81.4 UTEROVAGINAL PROLAPSE, UNSPECIFIED: Primary | ICD-10-CM

## 2024-07-01 PROCEDURE — 99999 PR PBB SHADOW E&M-EST. PATIENT-LVL III: CPT | Mod: PBBFAC,,, | Performed by: UROLOGY

## 2024-07-01 PROCEDURE — 87086 URINE CULTURE/COLONY COUNT: CPT | Performed by: UROLOGY

## 2024-07-01 NOTE — PROGRESS NOTES
CHIEF COMPLAINT:    Mrs. Ames is a 73 y.o. female presenting for a follow up on procidentia and to sign consents for Lefort Colpocleisis    PRESENTING ILLNESS:    Nia Ames is a 73 y.o. female who presents with a history of uterine prolapse.  She also had a history of post menopausal bleeding and is status post endometrial biopsy which was negative.  She was evaluated by Dr. Cordero in 2019 and it was recommended that she try a pessary however, the patient did not desire that.  She saw me in January of this year stating that she has had worsening of the prolapse, also has urge incontinence.  Urodynamics was performed which did not reveal occult stress incontinence.  She is not sexually active and does not desire to be in the future.    She would like to have definitive surgery, and we had discussed an  obliterative surgery for its success and decreased risk of recurrence.  She had a renal ultrasound which showed mild bilateral hydronephrosis.     , uterus present, not sexually active (no partner), the bowels are normal.     REVIEW OF SYSTEMS:    Review of Systems   Constitutional: Negative.    HENT: Negative.     Eyes: Negative.    Respiratory: Negative.     Cardiovascular: Negative.    Gastrointestinal: Negative.    Genitourinary:  Positive for urgency.   Musculoskeletal: Negative.    Skin: Negative.    Neurological: Negative.    Endo/Heme/Allergies: Negative.    Psychiatric/Behavioral: Negative.         PATIENT HISTORY:    Past Medical History:   Diagnosis Date    Diabetes mellitus     Hypertension        Past Surgical History:   Procedure Laterality Date    COLD KNIFE CONIZATION OF CERVIX N/A 2019    Procedure: CONE BIOPSY, CERVIX, USING COLD KNIFE;  Surgeon: Sapphire Jones MD;  Location: [facility];  Service: OB/GYN;  Laterality: N/A;    spinal fusions      TONSILLECTOMY      TUBAL LIGATION         Family History   Problem Relation Name Age of Onset    Heart attack Father      Diabetes Mother        Social History     Socioeconomic History    Marital status: Single   Tobacco Use    Smoking status: Some Days     Current packs/day: 0.50     Types: Cigarettes    Smokeless tobacco: Never   Substance and Sexual Activity    Alcohol use: No    Drug use: No    Sexual activity: Not Currently   Social History Narrative    ** Merged History Encounter **         ** Merged History Encounter **         ** Merged History Encounter **            Allergies:  Codeine and Penicillins    Medications:  Outpatient Encounter Medications as of 7/1/2024   Medication Sig Dispense Refill    amLODIPine (NORVASC) 10 MG tablet Take 10 mg by mouth once daily.      calcium carbonate (OS-PAL) 600 mg calcium (1,500 mg) Tab Take 1 tablet by mouth once daily.  3    ibuprofen (ADVIL,MOTRIN) 600 MG tablet Take 1 tablet (600 mg total) by mouth every 6 (six) hours as needed for Pain. 30 tablet 0    latanoprost 0.005 % ophthalmic solution Place into both eyes.      metFORMIN (GLUCOPHAGE) 500 MG tablet Take 500 mg by mouth daily with breakfast.  3    mupirocin (BACTROBAN) 2 % ointment Apply topically 3 (three) times daily. 30 g 0    triamcinolone acetonide 0.1% (KENALOG) 0.1 % cream APPLY TWICE A DAY AS NEEDED FOR ITCHY RAISED SKIN. DON'T USE ON FACE, ARMPITS, OR GROIN.  2    VITAMIN D2 50,000 unit capsule   3     No facility-administered encounter medications on file as of 7/1/2024.         PHYSICAL EXAMINATION:    The patient generally appears in good health, is appropriately interactive, and is in no apparent distress.    Skin: No lesions.    Mental: Cooperative with normal affect.    Neuro: Grossly intact.    HEENT: Normal. No evidence of lymphadenopathy.    Chest:  normal inspiratory effort.    Abdomen: Soft, non-tender. No masses or organomegaly. Bladder is not palpable. No evidence of flank discomfort. No evidence of inguinal hernia.    Extremities: No clubbing, cyanosis, or edema    Has procidentia.    PVR by catheterization was 30  ml    LABS:    Lab Results   Component Value Date    BUN 13 01/08/2024    CREATININE 0.9 01/08/2024       UA 1.020, pH 5, tr protein, otherwise, negative.     IMPRESSION:    Stage IV uterine prolapse    PLAN:    1. The catheterized specimen was sent for culture  2.  For Lefort colpocleisis and cystoscopy.  Information sheet from the [facility] website on colpocleisis provided.   3.  Consent signed after discussing risks     I spent 30 minutes with the patient of which more than half was spent in direct consultation with the patient in regards to our treatment and plan.

## 2024-07-01 NOTE — H&P (VIEW-ONLY)
CHIEF COMPLAINT:    Mrs. Ames is a 73 y.o. female presenting for a follow up on procidentia and to sign consents for Lefort Colpocleisis    PRESENTING ILLNESS:    Nia Ames is a 73 y.o. female who presents with a history of uterine prolapse.  She also had a history of post menopausal bleeding and is status post endometrial biopsy which was negative.  She was evaluated by Dr. Cordero in 2019 and it was recommended that she try a pessary however, the patient did not desire that.  She saw me in January of this year stating that she has had worsening of the prolapse, also has urge incontinence.  Urodynamics was performed which did not reveal occult stress incontinence.  She is not sexually active and does not desire to be in the future.    She would like to have definitive surgery, and we had discussed an  obliterative surgery for its success and decreased risk of recurrence.  She had a renal ultrasound which showed mild bilateral hydronephrosis.     , uterus present, not sexually active (no partner), the bowels are normal.     REVIEW OF SYSTEMS:    Review of Systems   Constitutional: Negative.    HENT: Negative.     Eyes: Negative.    Respiratory: Negative.     Cardiovascular: Negative.    Gastrointestinal: Negative.    Genitourinary:  Positive for urgency.   Musculoskeletal: Negative.    Skin: Negative.    Neurological: Negative.    Endo/Heme/Allergies: Negative.    Psychiatric/Behavioral: Negative.         PATIENT HISTORY:    Past Medical History:   Diagnosis Date    Diabetes mellitus     Hypertension        Past Surgical History:   Procedure Laterality Date    COLD KNIFE CONIZATION OF CERVIX N/A 2019    Procedure: CONE BIOPSY, CERVIX, USING COLD KNIFE;  Surgeon: Sapphire Jones MD;  Location: [facility];  Service: OB/GYN;  Laterality: N/A;    spinal fusions      TONSILLECTOMY      TUBAL LIGATION         Family History   Problem Relation Name Age of Onset    Heart attack Father      Diabetes Mother        Social History     Socioeconomic History    Marital status: Single   Tobacco Use    Smoking status: Some Days     Current packs/day: 0.50     Types: Cigarettes    Smokeless tobacco: Never   Substance and Sexual Activity    Alcohol use: No    Drug use: No    Sexual activity: Not Currently   Social History Narrative    ** Merged History Encounter **         ** Merged History Encounter **         ** Merged History Encounter **            Allergies:  Codeine and Penicillins    Medications:  Outpatient Encounter Medications as of 7/1/2024   Medication Sig Dispense Refill    amLODIPine (NORVASC) 10 MG tablet Take 10 mg by mouth once daily.      calcium carbonate (OS-PAL) 600 mg calcium (1,500 mg) Tab Take 1 tablet by mouth once daily.  3    ibuprofen (ADVIL,MOTRIN) 600 MG tablet Take 1 tablet (600 mg total) by mouth every 6 (six) hours as needed for Pain. 30 tablet 0    latanoprost 0.005 % ophthalmic solution Place into both eyes.      metFORMIN (GLUCOPHAGE) 500 MG tablet Take 500 mg by mouth daily with breakfast.  3    mupirocin (BACTROBAN) 2 % ointment Apply topically 3 (three) times daily. 30 g 0    triamcinolone acetonide 0.1% (KENALOG) 0.1 % cream APPLY TWICE A DAY AS NEEDED FOR ITCHY RAISED SKIN. DON'T USE ON FACE, ARMPITS, OR GROIN.  2    VITAMIN D2 50,000 unit capsule   3     No facility-administered encounter medications on file as of 7/1/2024.         PHYSICAL EXAMINATION:    The patient generally appears in good health, is appropriately interactive, and is in no apparent distress.    Skin: No lesions.    Mental: Cooperative with normal affect.    Neuro: Grossly intact.    HEENT: Normal. No evidence of lymphadenopathy.    Chest:  normal inspiratory effort.    Abdomen: Soft, non-tender. No masses or organomegaly. Bladder is not palpable. No evidence of flank discomfort. No evidence of inguinal hernia.    Extremities: No clubbing, cyanosis, or edema    Has procidentia.    PVR by catheterization was 30  ml    LABS:    Lab Results   Component Value Date    BUN 13 01/08/2024    CREATININE 0.9 01/08/2024       UA 1.020, pH 5, tr protein, otherwise, negative.     IMPRESSION:    Stage IV uterine prolapse    PLAN:    1. The catheterized specimen was sent for culture  2.  For Lefort colpocleisis and cystoscopy.  Information sheet from the [facility] website on colpocleisis provided.   3.  Consent signed after discussing risks     I spent 30 minutes with the patient of which more than half was spent in direct consultation with the patient in regards to our treatment and plan.

## 2024-07-02 LAB — BACTERIA UR CULT: NO GROWTH

## 2024-07-16 ENCOUNTER — ANESTHESIA EVENT (OUTPATIENT)
Dept: SURGERY | Facility: HOSPITAL | Age: 74
End: 2024-07-16
Payer: MEDICARE

## 2024-07-16 NOTE — ANESTHESIA PREPROCEDURE EVALUATION
[facility]  Anesthesia Pre-Operative Evaluation         Patient Name: Nia Ames  YOB: 1950  MRN: [id number]    SUBJECTIVE:     Pre-operative evaluation for Procedure(s) (LRB):  COLPOCLEISIS (N/A)  CYSTOSCOPY (N/A)     07/16/2024    Nia Ames is a 74 y.o. female w/ a significant PMHx as below who presents for the above procedure.      LDA: None documented.    Prev airway: None documented.     Drips: None documented.    Patient Active Problem List   Diagnosis    High risk HPV infection    MAO III (cervical intraepithelial neoplasia grade III) with severe dysplasia    Procidentia of uterus    Urge incontinence       Review of patient's allergies indicates:   Allergen Reactions    Codeine Hives    Penicillins Rash       Current Inpatient Medications:      No current facility-administered medications on file prior to encounter.     Current Outpatient Medications on File Prior to Encounter   Medication Sig Dispense Refill    amLODIPine (NORVASC) 10 MG tablet Take 10 mg by mouth once daily.      calcium carbonate (OS-PAL) 600 mg calcium (1,500 mg) Tab Take 1 tablet by mouth once daily. (Patient not taking: Reported on 7/1/2024)  3    ibuprofen (ADVIL,MOTRIN) 600 MG tablet Take 1 tablet (600 mg total) by mouth every 6 (six) hours as needed for Pain. 30 tablet 0    latanoprost 0.005 % ophthalmic solution Place into both eyes.      metFORMIN (GLUCOPHAGE) 500 MG tablet Take 500 mg by mouth daily with breakfast.  3    mupirocin (BACTROBAN) 2 % ointment Apply topically 3 (three) times daily. 30 g 0    triamcinolone acetonide 0.1% (KENALOG) 0.1 % cream APPLY TWICE A DAY AS NEEDED FOR ITCHY RAISED SKIN. DON'T USE ON FACE, ARMPITS, OR GROIN. (Patient not taking: Reported on 7/1/2024)  2    VITAMIN D2 50,000 unit capsule   3       Past Surgical History:   Procedure Laterality Date    COLD KNIFE CONIZATION OF CERVIX N/A 6/4/2019    Procedure: CONE  "BIOPSY, CERVIX, USING COLD KNIFE;  Surgeon: Sapphire Jones MD;  Location: [facility];  Service: OB/GYN;  Laterality: N/A;    spinal fusions      TONSILLECTOMY      TUBAL LIGATION         Social History     Socioeconomic History    Marital status: Single   Tobacco Use    Smoking status: Some Days     Current packs/day: 0.50     Types: Cigarettes    Smokeless tobacco: Never   Substance and Sexual Activity    Alcohol use: No    Drug use: No    Sexual activity: Not Currently   Social History Narrative    ** Merged History Encounter **         ** Merged History Encounter **         ** Merged History Encounter **            OBJECTIVE:     Vital Signs Range (Last 24H):         CBC:   No results for input(s): "WBC", "RBC", "HGB", "HCT", "PLT", "MCV", "MCH", "MCHC" in the last 72 hours.    CMP: No results for input(s): "NA", "K", "CL", "CO2", "BUN", "CREATININE", "GLU", "MG", "PHOS", "CALCIUM", "ALBUMIN", "PROT", "ALKPHOS", "ALT", "AST", "BILITOT" in the last 72 hours.    INR:  No results for input(s): "PT", "INR", "PROTIME", "APTT" in the last 72 hours.    Diagnostic Studies: No relevant studies.    EKG: No recent studies available.    2D ECHO:  No results found for this or any previous visit.      ASSESSMENT/PLAN:           Pre-op Assessment    I have reviewed the Patient Summary Reports.     I have reviewed the Nursing Notes. I have reviewed the NPO Status.   I have reviewed the Medications.     Review of Systems  Anesthesia Hx:  No problems with previous Anesthesia                Cardiovascular:     Hypertension                                        Renal/:     POP             Endocrine:  Diabetes               Physical Exam  General: Alert and Cooperative    Airway:  Mallampati: III / II  Mouth Opening: Normal  TM Distance: Normal  Tongue: Normal    Dental:  Any loose or missing teeth verified with patient      Anesthesia Plan  Type of Anesthesia, risks & benefits discussed:    Anesthesia Type: Gen ETT, Gen " Supraglottic Airway  Intra-op Monitoring Plan: Standard ASA Monitors  Post Op Pain Control Plan: multimodal analgesia  Induction:  IV  Airway Plan: Direct, Post-Induction  Informed Consent: Informed consent signed with the Patient and all parties understand the risks and agree with anesthesia plan.  All questions answered.   ASA Score: 3  Day of Surgery Review of History & Physical: H&P Update referred to the surgeon/provider.    Ready For Surgery From Anesthesia Perspective.     .

## 2024-07-16 NOTE — PRE-PROCEDURE INSTRUCTIONS
Patient was informed of pre-procedure instructions and arrival time. Pt verbalized understanding and is to be accompanied by daughter Bea.    Dear Nia ,     You are scheduled for a procedure with Dr. Lambert on 7/17/2024. Your scheduled arrival time is 5:15 am.  This arrival time is roughly 2 hours before your anticipated procedure time to allow sufficient time for pre-op.  Please wear comfortable clothing  This procedure will take place at the [facility] at the corner of [facility].  [facility] [place] [facility]. The address is:     [address] [place] [address].  [place] [id number]     After entering the building, proceed to the second floor and check in with registration.      Your fasting instructions are as follows:     Nothing to eat after midnight the evening before your surgery.   You may drink clear liquids up until 2 hours prior to your arrival time.      You MUST have a responsible adult to bring you home.     The morning of your procedure, please hold the following medications:  The evening before and morning of your procedure, please hold the following medications:  -Aspirin and Aspirin-containing products (Goody's powder, Excedrin)  -NSAIDs (Advil, Ibuprofen, Aleve, Diclofenac)  -Vitamins/Supplements  -Herbal remedies/Teas  -Stimulants (Adderall, Vyvanse, Adipex)  -Diabetic medication (Please bring with you day of procedure)  -Prescription creams/gels/lotions        *May take Tylenol if needed         The evening before and morning of your procedure, take a shower using antibacterial soap (ex: Hibiclens or Dial antibacterial soap). DO NOT apply deodorant, lotion, cologne, or anything else to the skin. Do not wear jewelry or bring any valuables with you.  .     Please do not wear contact lenses the day of your procedure.   Bring any inhalers that you may need.     If you have any procedure-specific questions, please call your surgeon's  office. Any other questions, don't hesitate to call at [phone]     Thanks,  Pre-Admit Testing  Anesthesia Dept OC

## 2024-07-17 ENCOUNTER — HOSPITAL ENCOUNTER (OUTPATIENT)
Facility: HOSPITAL | Age: 74
Discharge: HOME | End: 2024-07-17
Attending: UROLOGY | Admitting: UROLOGY
Payer: MEDICARE

## 2024-07-17 ENCOUNTER — ANESTHESIA (OUTPATIENT)
Dept: SURGERY | Facility: HOSPITAL | Age: 74
End: 2024-07-17
Payer: MEDICARE

## 2024-07-17 VITALS
TEMPERATURE: 98 F | OXYGEN SATURATION: 96 % | RESPIRATION RATE: 16 BRPM | DIASTOLIC BLOOD PRESSURE: 93 MMHG | SYSTOLIC BLOOD PRESSURE: 167 MMHG | HEART RATE: 95 BPM

## 2024-07-17 DIAGNOSIS — N81.3 PROCIDENTIA OF UTERUS: ICD-10-CM

## 2024-07-17 LAB
POCT GLUCOSE: 161 MG/DL (ref 70–110)
POCT GLUCOSE: 167 MG/DL (ref 70–110)

## 2024-07-17 PROCEDURE — 82962 GLUCOSE BLOOD TEST: CPT | Performed by: UROLOGY

## 2024-07-17 PROCEDURE — 25000003 PHARM REV CODE 250: Performed by: NURSE ANESTHETIST, CERTIFIED REGISTERED

## 2024-07-17 PROCEDURE — 27201423 OPTIME MED/SURG SUP & DEVICES STERILE SUPPLY: Performed by: UROLOGY

## 2024-07-17 PROCEDURE — 37000009 HC ANESTHESIA EA ADD 15 MINS: Performed by: UROLOGY

## 2024-07-17 PROCEDURE — 57250 REPAIR RECTUM & VAGINA: CPT | Mod: ,,, | Performed by: UROLOGY

## 2024-07-17 PROCEDURE — 94761 N-INVAS EAR/PLS OXIMETRY MLT: CPT

## 2024-07-17 PROCEDURE — C2631 REP DEV, URINARY, W/O SLING: HCPCS | Performed by: UROLOGY

## 2024-07-17 PROCEDURE — 71000033 HC RECOVERY, INTIAL HOUR: Performed by: UROLOGY

## 2024-07-17 PROCEDURE — 88305 TISSUE EXAM BY PATHOLOGIST: CPT | Performed by: STUDENT IN AN ORGANIZED HEALTH CARE EDUCATION/TRAINING PROGRAM

## 2024-07-17 PROCEDURE — 63600175 PHARM REV CODE 636 W HCPCS: Mod: JZ,JG | Performed by: NURSE ANESTHETIST, CERTIFIED REGISTERED

## 2024-07-17 PROCEDURE — 36000706: Performed by: UROLOGY

## 2024-07-17 PROCEDURE — 57120 COLPOCLEISIS LE FORT TYPE: CPT | Mod: 51,,, | Performed by: UROLOGY

## 2024-07-17 PROCEDURE — 99900035 HC TECH TIME PER 15 MIN (STAT)

## 2024-07-17 PROCEDURE — D9220A PRA ANESTHESIA: Mod: ,,, | Performed by: NURSE ANESTHETIST, CERTIFIED REGISTERED

## 2024-07-17 PROCEDURE — 37000008 HC ANESTHESIA 1ST 15 MINUTES: Performed by: UROLOGY

## 2024-07-17 PROCEDURE — 25000003 PHARM REV CODE 250: Performed by: UROLOGY

## 2024-07-17 PROCEDURE — 71000015 HC POSTOP RECOV 1ST HR: Performed by: UROLOGY

## 2024-07-17 PROCEDURE — 36000707: Performed by: UROLOGY

## 2024-07-17 RX ORDER — PROPOFOL 10 MG/ML
VIAL (ML) INTRAVENOUS
Status: DISCONTINUED | OUTPATIENT
Start: 2024-07-17 | End: 2024-07-17

## 2024-07-17 RX ORDER — SODIUM CHLORIDE 9 MG/ML
INJECTION, SOLUTION INTRAVENOUS CONTINUOUS
Status: DISCONTINUED | OUTPATIENT
Start: 2024-07-17 | End: 2024-07-17 | Stop reason: HOSPADM

## 2024-07-17 RX ORDER — HYDROMORPHONE HYDROCHLORIDE 1 MG/ML
0.2 INJECTION, SOLUTION INTRAMUSCULAR; INTRAVENOUS; SUBCUTANEOUS EVERY 5 MIN PRN
Status: DISCONTINUED | OUTPATIENT
Start: 2024-07-17 | End: 2024-07-17 | Stop reason: HOSPADM

## 2024-07-17 RX ORDER — PHENYLEPHRINE HYDROCHLORIDE 10 MG/ML
INJECTION INTRAVENOUS
Status: DISCONTINUED | OUTPATIENT
Start: 2024-07-17 | End: 2024-07-17

## 2024-07-17 RX ORDER — FENTANYL CITRATE 50 UG/ML
INJECTION, SOLUTION INTRAMUSCULAR; INTRAVENOUS
Status: DISCONTINUED | OUTPATIENT
Start: 2024-07-17 | End: 2024-07-17

## 2024-07-17 RX ORDER — LIDOCAINE HYDROCHLORIDE AND EPINEPHRINE 10; 10 MG/ML; UG/ML
INJECTION, SOLUTION INFILTRATION; PERINEURAL
Status: DISCONTINUED | OUTPATIENT
Start: 2024-07-17 | End: 2024-07-17 | Stop reason: HOSPADM

## 2024-07-17 RX ORDER — FAMOTIDINE 10 MG/ML
INJECTION INTRAVENOUS
Status: DISCONTINUED | OUTPATIENT
Start: 2024-07-17 | End: 2024-07-17

## 2024-07-17 RX ORDER — SULFAMETHOXAZOLE AND TRIMETHOPRIM 800; 160 MG/1; MG/1
1 TABLET ORAL 2 TIMES DAILY
Qty: 10 TABLET | Refills: 0 | Status: SHIPPED | OUTPATIENT
Start: 2024-07-17 | End: 2024-07-22

## 2024-07-17 RX ORDER — ACETAMINOPHEN 10 MG/ML
INJECTION, SOLUTION INTRAVENOUS
Status: DISCONTINUED | OUTPATIENT
Start: 2024-07-17 | End: 2024-07-17

## 2024-07-17 RX ORDER — ONDANSETRON HYDROCHLORIDE 2 MG/ML
INJECTION, SOLUTION INTRAVENOUS
Status: DISCONTINUED | OUTPATIENT
Start: 2024-07-17 | End: 2024-07-17

## 2024-07-17 RX ORDER — DEXAMETHASONE SODIUM PHOSPHATE 4 MG/ML
INJECTION, SOLUTION INTRA-ARTICULAR; INTRALESIONAL; INTRAMUSCULAR; INTRAVENOUS; SOFT TISSUE
Status: DISCONTINUED | OUTPATIENT
Start: 2024-07-17 | End: 2024-07-17

## 2024-07-17 RX ORDER — OXYCODONE HYDROCHLORIDE 5 MG/1
5 TABLET ORAL
Status: DISCONTINUED | OUTPATIENT
Start: 2024-07-17 | End: 2024-07-17 | Stop reason: HOSPADM

## 2024-07-17 RX ORDER — DIPHENHYDRAMINE HYDROCHLORIDE 50 MG/ML
INJECTION INTRAMUSCULAR; INTRAVENOUS
Status: DISCONTINUED | OUTPATIENT
Start: 2024-07-17 | End: 2024-07-17

## 2024-07-17 RX ORDER — ROCURONIUM BROMIDE 10 MG/ML
INJECTION, SOLUTION INTRAVENOUS
Status: DISCONTINUED | OUTPATIENT
Start: 2024-07-17 | End: 2024-07-17

## 2024-07-17 RX ORDER — PROCHLORPERAZINE EDISYLATE 5 MG/ML
5 INJECTION INTRAMUSCULAR; INTRAVENOUS EVERY 30 MIN PRN
Status: DISCONTINUED | OUTPATIENT
Start: 2024-07-17 | End: 2024-07-17 | Stop reason: HOSPADM

## 2024-07-17 RX ORDER — HYDROMORPHONE HYDROCHLORIDE 1 MG/ML
INJECTION, SOLUTION INTRAMUSCULAR; INTRAVENOUS; SUBCUTANEOUS
Status: DISCONTINUED | OUTPATIENT
Start: 2024-07-17 | End: 2024-07-17

## 2024-07-17 RX ORDER — GLUCAGON 1 MG
1 KIT INJECTION
Status: DISCONTINUED | OUTPATIENT
Start: 2024-07-17 | End: 2024-07-17 | Stop reason: HOSPADM

## 2024-07-17 RX ORDER — HALOPERIDOL 5 MG/ML
INJECTION INTRAMUSCULAR
Status: DISCONTINUED | OUTPATIENT
Start: 2024-07-17 | End: 2024-07-17

## 2024-07-17 RX ORDER — CEFAZOLIN SODIUM 1 G/3ML
INJECTION, POWDER, FOR SOLUTION INTRAMUSCULAR; INTRAVENOUS
Status: DISCONTINUED | OUTPATIENT
Start: 2024-07-17 | End: 2024-07-17

## 2024-07-17 RX ORDER — DEXMEDETOMIDINE HYDROCHLORIDE 100 UG/ML
INJECTION, SOLUTION INTRAVENOUS
Status: DISCONTINUED | OUTPATIENT
Start: 2024-07-17 | End: 2024-07-17

## 2024-07-17 RX ORDER — LIDOCAINE HYDROCHLORIDE 20 MG/ML
INJECTION INTRAVENOUS
Status: DISCONTINUED | OUTPATIENT
Start: 2024-07-17 | End: 2024-07-17

## 2024-07-17 RX ORDER — OXYCODONE AND ACETAMINOPHEN 5; 325 MG/1; MG/1
1 TABLET ORAL EVERY 4 HOURS PRN
Qty: 6 TABLET | Refills: 0 | Status: SHIPPED | OUTPATIENT
Start: 2024-07-17

## 2024-07-17 RX ADMIN — DIPHENHYDRAMINE HYDROCHLORIDE 12.5 MG: 50 INJECTION, SOLUTION INTRAMUSCULAR; INTRAVENOUS at 07:07

## 2024-07-17 RX ADMIN — DEXAMETHASONE SODIUM PHOSPHATE 4 MG: 4 INJECTION INTRA-ARTICULAR; INTRALESIONAL; INTRAMUSCULAR; INTRAVENOUS; SOFT TISSUE at 07:07

## 2024-07-17 RX ADMIN — FAMOTIDINE 20 MG: 10 INJECTION, SOLUTION INTRAVENOUS at 07:07

## 2024-07-17 RX ADMIN — INDIGO CARMINE 5 ML: 8 INJECTION, SOLUTION INTRAMUSCULAR; INTRAVENOUS at 09:07

## 2024-07-17 RX ADMIN — PHENYLEPHRINE HYDROCHLORIDE 150 MCG: 10 INJECTION INTRAVENOUS at 07:07

## 2024-07-17 RX ADMIN — ACETAMINOPHEN 1000 MG: 10 INJECTION INTRAVENOUS at 08:07

## 2024-07-17 RX ADMIN — PHENYLEPHRINE HYDROCHLORIDE 100 MCG: 10 INJECTION INTRAVENOUS at 10:07

## 2024-07-17 RX ADMIN — DEXMEDETOMIDINE HYDROCHLORIDE 4 MCG: 100 INJECTION, SOLUTION INTRAVENOUS at 11:07

## 2024-07-17 RX ADMIN — LIDOCAINE HYDROCHLORIDE 75 MG: 20 INJECTION INTRAVENOUS at 07:07

## 2024-07-17 RX ADMIN — CEFAZOLIN 2 G: 1 INJECTION, POWDER, FOR SOLUTION INTRAMUSCULAR; INTRAVENOUS at 07:07

## 2024-07-17 RX ADMIN — SODIUM CHLORIDE: 0.9 INJECTION, SOLUTION INTRAVENOUS at 06:07

## 2024-07-17 RX ADMIN — SUGAMMADEX 300 MG: 100 INJECTION, SOLUTION INTRAVENOUS at 10:07

## 2024-07-17 RX ADMIN — HALOPERIDOL LACTATE 0.5 MG: 5 INJECTION, SOLUTION INTRAMUSCULAR at 07:07

## 2024-07-17 RX ADMIN — ONDANSETRON 4 MG: 2 INJECTION INTRAMUSCULAR; INTRAVENOUS at 10:07

## 2024-07-17 RX ADMIN — PHENYLEPHRINE HYDROCHLORIDE 100 MCG: 10 INJECTION INTRAVENOUS at 11:07

## 2024-07-17 RX ADMIN — PHENYLEPHRINE HYDROCHLORIDE 50 MCG: 10 INJECTION INTRAVENOUS at 08:07

## 2024-07-17 RX ADMIN — FENTANYL CITRATE 50 MCG: 50 INJECTION, SOLUTION INTRAMUSCULAR; INTRAVENOUS at 07:07

## 2024-07-17 RX ADMIN — ROCURONIUM BROMIDE 50 MG: 10 INJECTION INTRAVENOUS at 07:07

## 2024-07-17 RX ADMIN — PROPOFOL 140 MG: 10 INJECTION, EMULSION INTRAVENOUS at 07:07

## 2024-07-17 RX ADMIN — DEXMEDETOMIDINE HYDROCHLORIDE 8 MCG: 100 INJECTION, SOLUTION INTRAVENOUS at 09:07

## 2024-07-17 RX ADMIN — FENTANYL CITRATE 25 MCG: 50 INJECTION, SOLUTION INTRAMUSCULAR; INTRAVENOUS at 09:07

## 2024-07-17 RX ADMIN — HYDROMORPHONE HYDROCHLORIDE 0.2 MG: 1 INJECTION, SOLUTION INTRAMUSCULAR; INTRAVENOUS; SUBCUTANEOUS at 11:07

## 2024-07-17 RX ADMIN — DEXMEDETOMIDINE HYDROCHLORIDE 8 MCG: 100 INJECTION, SOLUTION INTRAVENOUS at 07:07

## 2024-07-17 NOTE — ANESTHESIA PROCEDURE NOTES
Intubation    Date/Time: 7/17/2024 7:21 AM    Performed by: Aliya Winston CRNA  Authorized by: Mani Huber MD    Intubation:     Induction:  Intravenous    Intubated:  Postinduction    Mask Ventilation:  Easy with oral airway    Attempts:  1    Attempted By:  CRNA    Method of Intubation:  Video laryngoscopy    Blade:  Kay 3    Laryngeal View Grade: Grade I - full view of cords      Difficult Airway Encountered?: No      Complications:  None    Airway Device:  Oral endotracheal tube    Airway Device Size:  7.0    Style/Cuff Inflation:  Cuffed    Secured at:  The lips    Placement Verified By:  Capnometry    Complicating Factors:  Anterior larynx, small mouth, obesity and oropharyngeal edema or fat    Findings Post-Intubation:  BS equal bilateral and atraumatic/condition of teeth unchanged

## 2024-07-17 NOTE — INTERVAL H&P NOTE
The patient has been examined and the H&P has been reviewed:    I concur with the findings and no changes have occurred since H&P was written.    Anesthesia/Surgery risks, benefits and alternative options discussed and understood by patient/family.    UA 1.015, pH 6, otherwise, negative      There are no hospital problems to display for this patient.    Patient seen in holding.  No changes in clinical condition.  Proceed with planned procedure.

## 2024-07-17 NOTE — PLAN OF CARE
Discharge instructions provided to the patient. Patient verbalized understanding of the instructions.  Instructed the patient and her daughter that a red rubber tube is in place and it will be removed on Monday per Dr. Lambert.IV removed, cath tip intact.  VSS.   No concerns voiced. Escorted patient via wheelchair to family member awaiting in private vehicle.

## 2024-07-17 NOTE — OP NOTE
[facility]  Operative Note    Date: 07/17/2024    Pre-Op Diagnosis:  uterine procidentia    Post-Op Diagnosis:  same    Procedure(s) Performed:   [place]Forte Colpocleisis and rectocele repair.  Cystoscopy     Specimen(s):  vaginal epithelium    Staff Surgeon: Jyoti Lambert MD    EBL:  50 ml     Antibiotics:  2 g Ancef IV    IV fluids:  1300 ml crystalloid    Anesthesia: General endotracheal anesthesia    Indications: Nia Ames is a 74 y.o. female with Uterine procidentia.  She is not sexually active nor does she desire to be in the future.  Therefore we discussed obliterative surgery to enable provide relief for this prolapse.      Findings: Grade IV procidentia, there was also a low rectocele which was repaired separately.  Cystoscopy showed good restoration of the base of the bladder with brisk blue efflux bilaterally.     Estimated Blood Loss: see anesthesia records    Drains: 16 Liberian ortiz catheter    Procedure in detail:  After informed consent was obtained the patient was brought ot the operating suite and placed in the supine position.  Anesthesia was administered.  The patient was then placed in the dorsal lithotomy position and prepped and draped in the usual sterile fashion.   Pre-operative antibiotics were administered and a timeout was perofrmed.     A 16 Liberian ortiz catheter is placed and the bladder was drained.    A [place] retractor was used to gain exposure into the vaginal epithelium.  1% lidocaine with epinephrine was used to hydrodissect the vaginal epithelium overlying the defect.  The vaginal epithelium was then incised sharply with a 15 blade in the midline. The edge of the vaginal epithelium was feathered away from the underlying connective tissue sharply. Using tenotomy scissors, the vaginal epithelium was dissected and removed in strips, leaving the epithelium overlying the cervix intact.  Hemostasis was obtained.  The vaginal epithelium was then brought  together over a 14 Fr red santacruz catheter with 2-0 Vicryl.  The prolapse was then reduced sequentially with 3-0 PDS suture.  The lateral edges of the vaginal epithelium were brought together with the 2-0 Vicryl suture.  This was done sequentially most of the prolapse was reduced.  The anterior compartment was disproportionately prolapse so towards the end, it was imbricated in a vertical fashion to further reduce the cystocele.  The vaginal epithelium was further trimmed.      At this point, the 16-Vietnamese Ortiz catheter was removed and a 30-degree 17-Vietnamese cystoscope was introduced in the patient's bladder. Indigo carmine has previously been given.  There was clear blue efflux seen from both the right and left ureteral orifices and there were no mucosal defects. The cystoscope was removed and the catheter was replaced.      The vaginal epithelium was closed with 2-0 Vicryl sutures in an interrupted fashion.  A rectal exam was done and there was a low rectocele.  The posterior epithelium was injected with 1% lidocaine with epinephrine.  An incision was made with a 15 blade and the rectovaginal septum was  from the vaginal epithelium.  Hemostasis was obtained.  The defected was identified and reapproximated using 3-0 vicryl in a simple interrupted fashion.  Other site specific areas were then identified and brought together using 3-0 vicryl after a repeat rectal exam was performed.  Gloves were changed.    Once the repair was confirmed to be good, hemostasis was again obtained.  The vaginal epithelium was closed with a 2-0 Vicryl suture in a simple running fashion.      The ortiz catheter was removed.  The Red Santacruz catheter was tied together and 2% lidocaine gel was injected into the tracts to allow the catheter to be removed in 5 days (Monday)    The patient tolerated the procedure well and was transferred to the recovery room in stable condition.    Disposition:  The patient will be admitted for  observation overnight.  We plan to remove her vaginal packing and ortiz catheter tomorrow morning.      Jyoti Lambert MD

## 2024-07-17 NOTE — DISCHARGE INSTRUCTIONS
FOLLOWUP: In clinic  on Monday, July 22nd     DISCHARGE INSTRUCTIONS:          ACTIVITY  No heavy lifting (nothing greater than 8.5 lbs)  May bend to  an item off the floor  May take stairs  May walk at a slow pace  May drive as long as you are not taking a narcotic     BATHING  Do not swim or sit in a hot tub for 6 weeks.   May not take a bath, showers are fine.      DIET  Continue your normal diet. You may eat the same foods you ate before your procedure.  Drink plenty of fluids during the first 24-48 hours following your procedure.     MEDICATIONS  Resume all other previous medications from your prescribing physician.  Continue any pre-procedure antibiotics until they are all gone.  Bactrim DS for 5 days  Percocet for 6 doses     SIGNS AND SYMPTOMS TO REPORT TO THE DOCTOR  Chills or fever greater than 101° F within 24 hours of procedure.  Changes in urination, such as increased bleeding, foul smell, cloudy urine, or painful urination.  Call your doctor with any questions or concerns.     For any emergency situation, call 911 immediately or go to your nearest emergency room.

## 2024-07-17 NOTE — PLAN OF CARE
Pt in preop bay 32, VSS,  and IV inserted. Pt denies any open wounds on body or the use of any weight loss injections. Pt needs admission order, anesthesia consent signed, H&P, and updated H&P, otherwise ready to roll.    Procedural consents verified with pt.

## 2024-07-17 NOTE — DISCHARGE SUMMARY
[facility]  Discharge Note  Short Stay    Procedure(s) (LRB):  COLPOCLEISIS (N/A)  CYSTOSCOPY (N/A)  REPAIR, RECTOCELE      OUTCOME: Patient tolerated treatment/procedure well without complication and is now ready for discharge.    DISPOSITION: Home or Self Care    FINAL DIAGNOSIS:  uterine procidentia    FOLLOWUP: In clinic  on Monday, July 22nd    DISCHARGE INSTRUCTIONS:         ACTIVITY  No heavy lifting (nothing greater than 8.5 lbs)  May bend to  an item off the floor  May take stairs  May walk at a slow pace  May drive as long as you are not taking a narcotic     BATHING  Do not swim or sit in a hot tub for 6 weeks.   May not take a bath, showers are fine.     DIET  Continue your normal diet. You may eat the same foods you ate before your procedure.  Drink plenty of fluids during the first 24-48 hours following your procedure.     MEDICATIONS  Resume all other previous medications from your prescribing physician.  Continue any pre-procedure antibiotics until they are all gone.  Bactrim DS for 5 days  Percocet for 6 doses     SIGNS AND SYMPTOMS TO REPORT TO THE DOCTOR  Chills or fever greater than 101° F within 24 hours of procedure.  Changes in urination, such as increased bleeding, foul smell, cloudy urine, or painful urination.  Call your doctor with any questions or concerns.     For any emergency situation, call 911 immediately or go to your nearest emergency room.         TIME SPENT ON DISCHARGE: 10 minutes

## 2024-07-17 NOTE — TRANSFER OF CARE
Anesthesia Transfer of Care Note    Patient: Nia Ames    Procedure(s) Performed: Procedure(s) (LRB):  COLPOCLEISIS (N/A)  CYSTOSCOPY (N/A)  REPAIR, RECTOCELE    Patient location: PACU    Anesthesia Type: general    Transport from OR: Transported from OR on 6-10 L/min O2 by face mask with adequate spontaneous ventilation    Post pain: adequate analgesia    Post assessment: no apparent anesthetic complications    Post vital signs: stable    Level of consciousness: sedated    Nausea/Vomiting: no nausea/vomiting    Complications: none    Transfer of care protocol was followed      Last vitals: Visit Vitals  BP (!) 159/81   Pulse 93   Temp 36.3 °C (97.4 °F) (Temporal)   Resp 16   LMP  (LMP Unknown)   SpO2 100%   Breastfeeding No

## 2024-07-17 NOTE — PLAN OF CARE
Post void noted, 300 ml light blue urine from dye.  Scant amount of serosanguineous blood on shyam pad.  Patient ambulated approximately 25 ft with stand by assistance.  VSS, Denies pain or nausea, ready for discharge.

## 2024-07-19 LAB
FINAL PATHOLOGIC DIAGNOSIS: NORMAL
GROSS: NORMAL
Lab: NORMAL

## 2024-07-22 ENCOUNTER — OFFICE VISIT (OUTPATIENT)
Dept: UROLOGY | Facility: CLINIC | Age: 74
End: 2024-07-22
Payer: MEDICARE

## 2024-07-22 VITALS
DIASTOLIC BLOOD PRESSURE: 88 MMHG | WEIGHT: 154.13 LBS | HEART RATE: 97 BPM | SYSTOLIC BLOOD PRESSURE: 124 MMHG | BODY MASS INDEX: 27.3 KG/M2

## 2024-07-22 DIAGNOSIS — Z98.890 POST-OPERATIVE STATE: Primary | ICD-10-CM

## 2024-07-22 DIAGNOSIS — N13.30 HYDRONEPHROSIS, UNSPECIFIED HYDRONEPHROSIS TYPE: ICD-10-CM

## 2024-07-22 PROCEDURE — 3079F DIAST BP 80-89 MM HG: CPT | Mod: CPTII,S$GLB,, | Performed by: UROLOGY

## 2024-07-22 PROCEDURE — 1159F MED LIST DOCD IN RCRD: CPT | Mod: CPTII,S$GLB,, | Performed by: UROLOGY

## 2024-07-22 PROCEDURE — 99999 PR PBB SHADOW E&M-EST. PATIENT-LVL III: CPT | Mod: PBBFAC,,, | Performed by: UROLOGY

## 2024-07-22 PROCEDURE — 3074F SYST BP LT 130 MM HG: CPT | Mod: CPTII,S$GLB,, | Performed by: UROLOGY

## 2024-07-22 PROCEDURE — 99024 POSTOP FOLLOW-UP VISIT: CPT | Mod: S$GLB,,, | Performed by: UROLOGY

## 2024-07-22 PROCEDURE — 3288F FALL RISK ASSESSMENT DOCD: CPT | Mod: CPTII,S$GLB,, | Performed by: UROLOGY

## 2024-07-22 PROCEDURE — 1101F PT FALLS ASSESS-DOCD LE1/YR: CPT | Mod: CPTII,S$GLB,, | Performed by: UROLOGY

## 2024-07-22 PROCEDURE — 1126F AMNT PAIN NOTED NONE PRSNT: CPT | Mod: CPTII,S$GLB,, | Performed by: UROLOGY

## 2024-07-22 NOTE — Clinical Note
I ordered a renal ultrasound to follow up after she had her prolapse repair.  She had hydronephrosis when the uterus was down.  Can you schedule closer to her 6 week post op?  Thanks!

## 2024-07-22 NOTE — PROGRESS NOTES
CHIEF COMPLAINT:    Mrs. Ames is a 74 y.o. female presenting for a post op visit, following Lefort Colpocleisis on 7/17/2024.    PRESENTING ILLNESS:    Nia Ames is a 74 y.o. female who returns stating she is doing well.  She still has a discharge.  Finds the catheter in the channels bothersome.  No other complaints.       Past Surgical History:   Procedure Laterality Date    ABLATION COLPOCLESIS N/A 7/17/2024    Procedure: COLPOCLEISIS;  Surgeon: Jyoti Lamebrt MD;  Location: [facility] OR;  Service: Urology;  Laterality: N/A;  2 hours    COLD KNIFE CONIZATION OF CERVIX N/A 6/4/2019    Procedure: CONE BIOPSY, CERVIX, USING COLD KNIFE;  Surgeon: Sapphire Jones MD;  Location: [facility] OR;  Service: OB/GYN;  Laterality: N/A;    CYSTOSCOPY N/A 7/17/2024    Procedure: CYSTOSCOPY;  Surgeon: Jyoti Lambert MD;  Location: [facility] OR;  Service: Urology;  Laterality: N/A;    REPAIR OF RECTOCELE N/A 7/17/2024    Procedure: REPAIR, RECTOCELE;  Surgeon: Jyoti Lambert MD;  Location: [facility] OR;  Service: Urology;  Laterality: N/A;    spinal fusions      TONSILLECTOMY      TUBAL LIGATION         Allergies:  Codeine and Penicillins    Medications:  Outpatient Encounter Medications as of 7/22/2024   Medication Sig Dispense Refill    amLODIPine (NORVASC) 10 MG tablet Take 10 mg by mouth once daily.      calcium carbonate (OS-PAL) 600 mg calcium (1,500 mg) Tab Take 1 tablet by mouth once daily.  3    ibuprofen (ADVIL,MOTRIN) 600 MG tablet Take 1 tablet (600 mg total) by mouth every 6 (six) hours as needed for Pain. 30 tablet 0    latanoprost 0.005 % ophthalmic solution Place into both eyes.      metFORMIN (GLUCOPHAGE) 500 MG tablet Take 500 mg by mouth daily with breakfast.  3    mupirocin (BACTROBAN) 2 % ointment Apply topically 3 (three) times daily. 30 g 0    oxyCODONE-acetaminophen (PERCOCET) 5-325 mg per tablet Take 1 tablet by mouth every 4 (four) hours as needed for Pain. 6 tablet 0     sulfamethoxazole-trimethoprim 800-160mg (BACTRIM DS) 800-160 mg Tab Take 1 tablet by mouth 2 (two) times daily. for 5 days 10 tablet 0    triamcinolone acetonide 0.1% (KENALOG) 0.1 % cream APPLY TWICE A DAY AS NEEDED FOR ITCHY RAISED SKIN. DON'T USE ON FACE, ARMPITS, OR GROIN.  2    VITAMIN D2 50,000 unit capsule   3     No facility-administered encounter medications on file as of 7/22/2024.         PHYSICAL EXAMINATION:    The patient generally appears in good health, is appropriately interactive, and is in no apparent distress.    Skin: No lesions.    Mental: Cooperative with normal affect.    Neuro: Grossly intact.    HEENT: Normal. No evidence of lymphadenopathy.    Chest:  normal inspiratory effort.    Abdomen:  Soft, non-tender. No masses or organomegaly. Bladder is not palpable. No evidence of flank discomfort. No evidence of inguinal hernia.    Extremities: No clubbing, cyanosis, or edema    NOTE:  the exam was carried out with a nurse chaperone present  Normal external female genitalia  Urethral meatus is normal  The red santacruz catheter was removed without difficulty      IMPRESSION:    Post op state    PLAN:    1.  Reiterated the post op limitations  2.  May see sutures closer to the 6 week follow up  3.  Follow up for the 6 week post op.  Repeat renal ultrasound to follow up on her history of hydronephrosis from the prolapse.

## 2024-07-24 ENCOUNTER — TELEPHONE (OUTPATIENT)
Dept: UROLOGY | Facility: CLINIC | Age: 74
End: 2024-07-24
Payer: MEDICARE

## 2024-07-24 NOTE — TELEPHONE ENCOUNTER
----- Message from Ale Nielsen sent at 7/24/2024 11:40 AM CDT -----  Regarding: pt advice  Contact: [phone]  Pt asking to speak to the provider in regards to a test that was ordered. Pls call to discuss.

## 2024-07-24 NOTE — TELEPHONE ENCOUNTER
----- Message from Jyoti Lambert MD sent at 7/22/2024  6:16 PM CDT -----  I ordered a renal ultrasound to follow up after she had her prolapse repair.  She had hydronephrosis when the uterus was down.  Can you schedule closer to her 6 week post op?  Thanks!

## 2024-07-29 ENCOUNTER — OFFICE VISIT (OUTPATIENT)
Dept: UROLOGY | Facility: CLINIC | Age: 74
End: 2024-07-29
Payer: MEDICARE

## 2024-07-29 DIAGNOSIS — Z98.890 POST-OPERATIVE STATE: Primary | ICD-10-CM

## 2024-07-29 PROCEDURE — 99024 POSTOP FOLLOW-UP VISIT: CPT | Mod: 95,,, | Performed by: UROLOGY

## 2024-07-29 NOTE — PROGRESS NOTES
The patient location is: [place]  The chief complaint leading to consultation is: follow up after Lefort Colpocleisis on 7/17/2024 .    Visit type: audio only    Time with patient:  10 minutes of total time spent on the encounter, which includes face to face time and non-face to face time preparing to see the patient (eg, review of tests), obtaining and/or reviewing separately obtained history, documenting clinical information in the electronic or other health record, independently interpreting results (not separately reported) and communicating results to the patient/family/caregiver, or care coordination (not separately reported).     Each patient to whom he or she provides medical services by telemedicine is:  (1) informed of the relationship between the physician and patient and the respective role of any other health care provider with respect to management of the patient; and (2) notified that he or she may decline to receive medical services by telemedicine and may withdraw from such care at any time.      CHIEF COMPLAINT:    Mrs. Ames is a 74 y.o. female presenting for a post op visit, following Lefort Colpocleisis on 7/17/2024.      PRESENTING ILLNESS:    Nia Ames is a 74 y.o. female who returns for follow up after the above surgery.  She states she is doing well.  No problems with voiding or having a bowel movement.  She has occasional small volume urge incontinence.  No pain, discharge has decreased significantly.  She states she takes showers.       Past Surgical History:   Procedure Laterality Date    ABLATION COLPOCLESIS N/A 7/17/2024    Procedure: COLPOCLEISIS;  Surgeon: Jyoti Lambert MD;  Location: [facility] OR;  Service: Urology;  Laterality: N/A;  2 hours    COLD KNIFE CONIZATION OF CERVIX N/A 6/4/2019    Procedure: CONE BIOPSY, CERVIX, USING COLD KNIFE;  Surgeon: Sapphire Jones MD;  Location: [facility] OR;  Service: OB/GYN;  Laterality: N/A;    CYSTOSCOPY N/A 7/17/2024    Procedure:  CYSTOSCOPY;  Surgeon: Jyoti Lambert MD;  Location: [facility] OR;  Service: Urology;  Laterality: N/A;    REPAIR OF RECTOCELE N/A 2024    Procedure: REPAIR, RECTOCELE;  Surgeon: Jyoti Lambert MD;  Location: [facility] OR;  Service: Urology;  Laterality: N/A;    spinal fusions      TONSILLECTOMY      TUBAL LIGATION         Allergies:  Codeine and Penicillins    Medications:  Outpatient Encounter Medications as of 2024   Medication Sig Dispense Refill    amLODIPine (NORVASC) 10 MG tablet Take 10 mg by mouth once daily.      calcium carbonate (OS-PAL) 600 mg calcium (1,500 mg) Tab Take 1 tablet by mouth once daily.  3    ibuprofen (ADVIL,MOTRIN) 600 MG tablet Take 1 tablet (600 mg total) by mouth every 6 (six) hours as needed for Pain. 30 tablet 0    latanoprost 0.005 % ophthalmic solution Place into both eyes.      metFORMIN (GLUCOPHAGE) 500 MG tablet Take 500 mg by mouth daily with breakfast.  3    mupirocin (BACTROBAN) 2 % ointment Apply topically 3 (three) times daily. 30 g 0    oxyCODONE-acetaminophen (PERCOCET) 5-325 mg per tablet Take 1 tablet by mouth every 4 (four) hours as needed for Pain. (Patient not taking: Reported on 2024) 6 tablet 0    [] sulfamethoxazole-trimethoprim 800-160mg (BACTRIM DS) 800-160 mg Tab Take 1 tablet by mouth 2 (two) times daily. for 5 days (Patient not taking: Reported on 2024) 10 tablet 0    triamcinolone acetonide 0.1% (KENALOG) 0.1 % cream APPLY TWICE A DAY AS NEEDED FOR ITCHY RAISED SKIN. DON'T USE ON FACE, ARMPITS, OR GROIN.  2    VITAMIN D2 50,000 unit capsule   3     No facility-administered encounter medications on file as of 2024.         PHYSICAL EXAMINATION:    The patient generally sounds in good health, is appropriately interactive, and is in no apparent distress.    Mental: Cooperative with normal affect.    Chest:  normal inspiratory effort.    IMPRESSION:    Post op state    PLAN:    1.  Reiterated the post op limitations  2.  May see  sutures closer to the 6 week follow up  3.  Follow up for the 6 week post op

## 2024-08-26 ENCOUNTER — OFFICE VISIT (OUTPATIENT)
Dept: UROLOGY | Facility: CLINIC | Age: 74
End: 2024-08-26
Payer: MEDICARE

## 2024-08-26 VITALS
WEIGHT: 158.31 LBS | HEART RATE: 118 BPM | BODY MASS INDEX: 28.04 KG/M2 | DIASTOLIC BLOOD PRESSURE: 91 MMHG | SYSTOLIC BLOOD PRESSURE: 152 MMHG

## 2024-08-26 DIAGNOSIS — N32.81 OVERACTIVE BLADDER: Primary | ICD-10-CM

## 2024-08-26 PROCEDURE — 1159F MED LIST DOCD IN RCRD: CPT | Mod: CPTII,S$GLB,, | Performed by: UROLOGY

## 2024-08-26 PROCEDURE — 3077F SYST BP >= 140 MM HG: CPT | Mod: CPTII,S$GLB,, | Performed by: UROLOGY

## 2024-08-26 PROCEDURE — 99999 PR PBB SHADOW E&M-EST. PATIENT-LVL III: CPT | Mod: PBBFAC,,, | Performed by: UROLOGY

## 2024-08-26 PROCEDURE — 3080F DIAST BP >= 90 MM HG: CPT | Mod: CPTII,S$GLB,, | Performed by: UROLOGY

## 2024-08-26 PROCEDURE — 99024 POSTOP FOLLOW-UP VISIT: CPT | Mod: S$GLB,,, | Performed by: UROLOGY

## 2024-08-26 RX ORDER — OXYBUTYNIN CHLORIDE 10 MG/1
10 TABLET, EXTENDED RELEASE ORAL DAILY
Qty: 90 TABLET | Refills: 3 | Status: SHIPPED | OUTPATIENT
Start: 2024-08-26

## 2024-08-26 NOTE — PROGRESS NOTES
CHIEF COMPLAINT:    Mrs. Ames is a 74 y.o. female presenting for a post op visit, following colpocleisis on 7/17/2024.    PRESENTING ILLNESS:    Nia Ames is a 74 y.o. female who returns for follow up.  She states she is doing well.  No problems urinating or having a bowel movement.  Sometimes she has urge incontinence.  She switched to wearing underwear with a pad for the urge incontinence.  Not taking any medication.     The renal ultrasound was repeated and showed resolution of the mild right hydronephrosis.        Past Surgical History:   Procedure Laterality Date    ABLATION COLPOCLESIS N/A 7/17/2024    Procedure: COLPOCLEISIS;  Surgeon: Jyoti Lambert MD;  Location: [facility] OR;  Service: Urology;  Laterality: N/A;  2 hours    COLD KNIFE CONIZATION OF CERVIX N/A 6/4/2019    Procedure: CONE BIOPSY, CERVIX, USING COLD KNIFE;  Surgeon: Sapphire Jones MD;  Location: [facility] OR;  Service: OB/GYN;  Laterality: N/A;    CYSTOSCOPY N/A 7/17/2024    Procedure: CYSTOSCOPY;  Surgeon: Jyoti Lambert MD;  Location: [facility] OR;  Service: Urology;  Laterality: N/A;    REPAIR OF RECTOCELE N/A 7/17/2024    Procedure: REPAIR, RECTOCELE;  Surgeon: Jyoti Lambert MD;  Location: [facility] OR;  Service: Urology;  Laterality: N/A;    spinal fusions      TONSILLECTOMY      TUBAL LIGATION         Allergies:  Codeine and Penicillins    Medications:  Outpatient Encounter Medications as of 8/26/2024   Medication Sig Dispense Refill    amLODIPine (NORVASC) 10 MG tablet Take 10 mg by mouth once daily.      calcium carbonate (OS-PAL) 600 mg calcium (1,500 mg) Tab Take 1 tablet by mouth once daily.  3    ibuprofen (ADVIL,MOTRIN) 600 MG tablet Take 1 tablet (600 mg total) by mouth every 6 (six) hours as needed for Pain. 30 tablet 0    latanoprost 0.005 % ophthalmic solution Place into both eyes.      metFORMIN (GLUCOPHAGE) 500 MG tablet Take 500 mg by mouth daily with breakfast.  3    mupirocin (BACTROBAN) 2 % ointment Apply  topically 3 (three) times daily. 30 g 0    oxyCODONE-acetaminophen (PERCOCET) 5-325 mg per tablet Take 1 tablet by mouth every 4 (four) hours as needed for Pain. (Patient not taking: Reported on 7/22/2024) 6 tablet 0    triamcinolone acetonide 0.1% (KENALOG) 0.1 % cream APPLY TWICE A DAY AS NEEDED FOR ITCHY RAISED SKIN. DON'T USE ON FACE, ARMPITS, OR GROIN.  2    VITAMIN D2 50,000 unit capsule   3     No facility-administered encounter medications on file as of 8/26/2024.         PHYSICAL EXAMINATION:    The patient generally appears in good health, is appropriately interactive, and is in no apparent distress.    Skin: No lesions.    Mental: Cooperative with normal affect.    Neuro: Grossly intact.    HEENT: Normal. No evidence of lymphadenopathy.    Chest:  normal inspiratory effort.    Abdomen:  Soft, non-tender. No masses or organomegaly. Bladder is not palpable. No evidence of flank discomfort. No evidence of inguinal hernia.    Extremities: No clubbing, cyanosis, or edema    NOTE:  the exam was carried out with a nurse chaperone present  Normal external female genitalia  Grade II urogenital atrophy  Urethral meatus is normal  Urethra and bladder are nontender to bimanual exam  The remnant of the vagina is well supported anteriorly and posteriorly       LABS:    UA 1.020, pH 5, ++ leuk, tr blood, otherwise, negative    Renal ultrasound on 7/22/2024 showed resolution of the hydronephrosis    IMPRESSION:    Post op state    PLAN:    1.  Lifted the post op limitations  2.  Oxybutynin for the urge incontinence.  Sent to [facility].  Discussed the side effects.  She will let me know if she cannot tolerate it.   3.  Follow up in 3 months

## 2024-12-02 ENCOUNTER — OFFICE VISIT (OUTPATIENT)
Dept: UROLOGY | Facility: CLINIC | Age: 74
End: 2024-12-02
Payer: MEDICARE

## 2024-12-02 VITALS
HEART RATE: 110 BPM | DIASTOLIC BLOOD PRESSURE: 82 MMHG | SYSTOLIC BLOOD PRESSURE: 156 MMHG | WEIGHT: 156.5 LBS | BODY MASS INDEX: 27.73 KG/M2

## 2024-12-02 DIAGNOSIS — N95.8 GENITOURINARY SYNDROME OF MENOPAUSE: ICD-10-CM

## 2024-12-02 DIAGNOSIS — Z09 FOLLOW-UP EXAMINATION AFTER UROLOGICAL SURGERY: Primary | ICD-10-CM

## 2024-12-02 DIAGNOSIS — N32.81 OVERACTIVE BLADDER: ICD-10-CM

## 2024-12-02 PROBLEM — N81.3 PROCIDENTIA OF UTERUS: Status: RESOLVED | Noted: 2024-01-08 | Resolved: 2024-12-02

## 2024-12-02 PROCEDURE — 99214 OFFICE O/P EST MOD 30 MIN: CPT | Mod: S$GLB,,, | Performed by: UROLOGY

## 2024-12-02 PROCEDURE — 3077F SYST BP >= 140 MM HG: CPT | Mod: CPTII,S$GLB,, | Performed by: UROLOGY

## 2024-12-02 PROCEDURE — 1126F AMNT PAIN NOTED NONE PRSNT: CPT | Mod: CPTII,S$GLB,, | Performed by: UROLOGY

## 2024-12-02 PROCEDURE — 1159F MED LIST DOCD IN RCRD: CPT | Mod: CPTII,S$GLB,, | Performed by: UROLOGY

## 2024-12-02 PROCEDURE — 3008F BODY MASS INDEX DOCD: CPT | Mod: CPTII,S$GLB,, | Performed by: UROLOGY

## 2024-12-02 PROCEDURE — 99999 PR PBB SHADOW E&M-EST. PATIENT-LVL III: CPT | Mod: PBBFAC,,, | Performed by: UROLOGY

## 2024-12-02 PROCEDURE — 3079F DIAST BP 80-89 MM HG: CPT | Mod: CPTII,S$GLB,, | Performed by: UROLOGY

## 2024-12-02 PROCEDURE — 3288F FALL RISK ASSESSMENT DOCD: CPT | Mod: CPTII,S$GLB,, | Performed by: UROLOGY

## 2024-12-02 PROCEDURE — 1101F PT FALLS ASSESS-DOCD LE1/YR: CPT | Mod: CPTII,S$GLB,, | Performed by: UROLOGY

## 2024-12-02 NOTE — PROGRESS NOTES
CHIEF COMPLAINT:    Mrs. Ames is a 74 y.o. female presenting for a 3 month follow up after colpocleisis 7/17/2024    PRESENTING ILLNESS:    Nia Ames is a 74 y.o. female who presents with a history of pelvic organ prolapse with hydronephrosis.  She is status post colpocleisis on 7/17/2024.  She states she is doing very well on the oxybutynin.  She wears a thin liner just in case but it does not get wet.  She states she rarely has an urge incontinence episode.  She feels like everything is still up. No prolapse symptoms.     REVIEW OF SYSTEMS:    Review of Systems   Constitutional: Negative.    HENT: Negative.     Eyes: Negative.    Respiratory: Negative.     Gastrointestinal: Negative.    Genitourinary: Negative.    Musculoskeletal: Negative.    Skin:  Positive for rash.   Neurological: Negative.    Endo/Heme/Allergies:         Diabetic   Psychiatric/Behavioral: Negative.         PATIENT HISTORY:    Past Medical History:   Diagnosis Date    Diabetes mellitus     Hypertension        Past Surgical History:   Procedure Laterality Date    ABLATION COLPOCLESIS N/A 7/17/2024    Procedure: COLPOCLEISIS;  Surgeon: Jyoti Lambert MD;  Location: [facility] OR;  Service: Urology;  Laterality: N/A;  2 hours    COLD KNIFE CONIZATION OF CERVIX N/A 6/4/2019    Procedure: CONE BIOPSY, CERVIX, USING COLD KNIFE;  Surgeon: Sapphire Jones MD;  Location: [facility] OR;  Service: OB/GYN;  Laterality: N/A;    CYSTOSCOPY N/A 7/17/2024    Procedure: CYSTOSCOPY;  Surgeon: Jyoti Lambert MD;  Location: [facility] OR;  Service: Urology;  Laterality: N/A;    REPAIR OF RECTOCELE N/A 7/17/2024    Procedure: REPAIR, RECTOCELE;  Surgeon: Jyoti Lambert MD;  Location: [facility] OR;  Service: Urology;  Laterality: N/A;    spinal fusions      TONSILLECTOMY      TUBAL LIGATION         Family History   Problem Relation Name Age of Onset    Heart attack Father      Diabetes Mother      Breast cancer Neg Hx      Cancer Neg Hx      Ovarian cancer Neg  Hx      Vaginal cancer Neg Hx      Endometrial cancer Neg Hx      Cervical cancer Neg Hx         Social History     Socioeconomic History    Marital status: Single   Tobacco Use    Smoking status: Some Days     Current packs/day: 0.50     Types: Cigarettes    Smokeless tobacco: Never   Substance and Sexual Activity    Alcohol use: No    Drug use: No    Sexual activity: Not Currently   Social History Narrative    ** Merged History Encounter **         ** Merged History Encounter **         ** Merged History Encounter **            Allergies:  Codeine and Penicillins    Medications:  Outpatient Encounter Medications as of 12/2/2024   Medication Sig Dispense Refill    amLODIPine (NORVASC) 10 MG tablet Take 10 mg by mouth once daily.      calcium carbonate (OS-PAL) 600 mg calcium (1,500 mg) Tab Take 1 tablet by mouth once daily.  3    ibuprofen (ADVIL,MOTRIN) 600 MG tablet Take 1 tablet (600 mg total) by mouth every 6 (six) hours as needed for Pain. 30 tablet 0    latanoprost 0.005 % ophthalmic solution Place into both eyes.      metFORMIN (GLUCOPHAGE) 500 MG tablet Take 500 mg by mouth daily with breakfast.  3    mupirocin (BACTROBAN) 2 % ointment Apply topically 3 (three) times daily. 30 g 0    oxybutynin (DITROPAN-XL) 10 MG 24 hr tablet Take 1 tablet (10 mg total) by mouth once daily. 90 tablet 3    oxyCODONE-acetaminophen (PERCOCET) 5-325 mg per tablet Take 1 tablet by mouth every 4 (four) hours as needed for Pain. (Patient not taking: Reported on 7/22/2024) 6 tablet 0    triamcinolone acetonide 0.1% (KENALOG) 0.1 % cream APPLY TWICE A DAY AS NEEDED FOR ITCHY RAISED SKIN. DON'T USE ON FACE, ARMPITS, OR GROIN.  2    VITAMIN D2 50,000 unit capsule   3     No facility-administered encounter medications on file as of 12/2/2024.         PHYSICAL EXAMINATION:    The patient generally appears in good health, is appropriately interactive, and is in no apparent distress.    Skin: No lesions.    Mental: Cooperative with  normal affect.    Neuro: Grossly intact.    HEENT: Normal. No evidence of lymphadenopathy.    Chest:  normal inspiratory effort.    Abdomen: Soft, non-tender. No masses or organomegaly. Bladder is not palpable. No evidence of flank discomfort. No evidence of inguinal hernia.    Extremities: No clubbing, cyanosis, or edema    NOTE:  the exam was carried out with a nurse chaperone present  Normal external female genitalia, foreshortened vagina consistent with history of colpocleisis  Grade II urogenital atrophy  Urethral meatus is normal  Well supported anteriorly and posteriorly for the vaginal remnant    LABS:    Lab Results   Component Value Date    BUN 13 01/08/2024    CREATININE 0.9 01/08/2024       UA 1.020, pH 5, tr blood, otherwise, negative.     IMPRESSION:    Status post urologic surgery follow up exam  Genitourinary syndrome of menopause (GSM)  OAB    PLAN:    1.  Follow up in 6 months    I spent a total of 30 minutes on the day of the visit.  This includes face to face time and non-face to face time preparing to see the patient (eg, review of tests), obtaining and/or reviewing separately obtained history, documenting clinical information in the electronic or other health record, independently interpreting results and communicating results to the patient/family/caregiver, or care coordinator.

## 2025-06-02 ENCOUNTER — LAB VISIT (OUTPATIENT)
Dept: LAB | Facility: HOSPITAL | Age: 75
End: 2025-06-02
Attending: UROLOGY
Payer: MEDICARE

## 2025-06-02 ENCOUNTER — OFFICE VISIT (OUTPATIENT)
Dept: UROLOGY | Facility: CLINIC | Age: 75
End: 2025-06-02
Payer: MEDICARE

## 2025-06-02 VITALS
DIASTOLIC BLOOD PRESSURE: 116 MMHG | WEIGHT: 147.06 LBS | HEART RATE: 109 BPM | BODY MASS INDEX: 26.05 KG/M2 | SYSTOLIC BLOOD PRESSURE: 177 MMHG

## 2025-06-02 DIAGNOSIS — N95.8 GENITOURINARY SYNDROME OF MENOPAUSE: ICD-10-CM

## 2025-06-02 DIAGNOSIS — Z09 FOLLOW-UP EXAMINATION AFTER UROLOGICAL SURGERY: ICD-10-CM

## 2025-06-02 DIAGNOSIS — Z09 FOLLOW-UP EXAMINATION AFTER UROLOGICAL SURGERY: Primary | ICD-10-CM

## 2025-06-02 LAB
ANION GAP (OHS): 9 MMOL/L (ref 8–16)
BILIRUBIN, POC UA: NEGATIVE
BLOOD, POC UA: ABNORMAL
BUN SERPL-MCNC: 16 MG/DL (ref 8–23)
CALCIUM SERPL-MCNC: 9.6 MG/DL (ref 8.7–10.5)
CHLORIDE SERPL-SCNC: 103 MMOL/L (ref 95–110)
CLARITY, UA: CLEAR
CO2 SERPL-SCNC: 25 MMOL/L (ref 23–29)
COLOR, UA: YELLOW
CREAT SERPL-MCNC: 0.8 MG/DL (ref 0.5–1.4)
GFR SERPLBLD CREATININE-BSD FMLA CKD-EPI: >60 ML/MIN/1.73/M2
GLUCOSE SERPL-MCNC: 111 MG/DL (ref 70–110)
GLUCOSE, POC UA: NEGATIVE
KETONES, POC UA: NEGATIVE
LEUKOCYTE EST, POC UA: NEGATIVE
NITRITE, POC UA: NEGATIVE
PH UR STRIP: 5.5 [PH] (ref 5–8)
POTASSIUM SERPL-SCNC: 4.9 MMOL/L (ref 3.5–5.1)
PROTEIN, POC UA: NEGATIVE
SODIUM SERPL-SCNC: 137 MMOL/L (ref 136–145)
SPECIFIC GRAVITY, POC UA: 1.02 (ref 1–1.03)
UROBILINOGEN, POC UA: 0.2 E.U./DL

## 2025-06-02 PROCEDURE — 3080F DIAST BP >= 90 MM HG: CPT | Mod: CPTII,S$GLB,, | Performed by: UROLOGY

## 2025-06-02 PROCEDURE — 80048 BASIC METABOLIC PNL TOTAL CA: CPT

## 2025-06-02 PROCEDURE — 99215 OFFICE O/P EST HI 40 MIN: CPT | Mod: S$GLB,,, | Performed by: UROLOGY

## 2025-06-02 PROCEDURE — 3077F SYST BP >= 140 MM HG: CPT | Mod: CPTII,S$GLB,, | Performed by: UROLOGY

## 2025-06-02 PROCEDURE — 1101F PT FALLS ASSESS-DOCD LE1/YR: CPT | Mod: CPTII,S$GLB,, | Performed by: UROLOGY

## 2025-06-02 PROCEDURE — 4010F ACE/ARB THERAPY RXD/TAKEN: CPT | Mod: CPTII,S$GLB,, | Performed by: UROLOGY

## 2025-06-02 PROCEDURE — 3288F FALL RISK ASSESSMENT DOCD: CPT | Mod: CPTII,S$GLB,, | Performed by: UROLOGY

## 2025-06-02 PROCEDURE — 1159F MED LIST DOCD IN RCRD: CPT | Mod: CPTII,S$GLB,, | Performed by: UROLOGY

## 2025-06-02 PROCEDURE — 99999 PR PBB SHADOW E&M-EST. PATIENT-LVL III: CPT | Mod: PBBFAC,,, | Performed by: UROLOGY

## 2025-06-02 PROCEDURE — 36415 COLL VENOUS BLD VENIPUNCTURE: CPT

## 2025-06-02 PROCEDURE — G2211 COMPLEX E/M VISIT ADD ON: HCPCS | Mod: S$GLB,,, | Performed by: UROLOGY

## 2025-06-02 PROCEDURE — 1125F AMNT PAIN NOTED PAIN PRSNT: CPT | Mod: CPTII,S$GLB,, | Performed by: UROLOGY

## 2025-06-02 PROCEDURE — 3008F BODY MASS INDEX DOCD: CPT | Mod: CPTII,S$GLB,, | Performed by: UROLOGY

## 2025-06-20 ENCOUNTER — TELEPHONE (OUTPATIENT)
Dept: UROLOGY | Facility: CLINIC | Age: 75
End: 2025-06-20
Payer: MEDICARE

## 2025-06-20 NOTE — TELEPHONE ENCOUNTER
Copied from CRM #[id number]. Topic: General Inquiry - Return Call  >> 2025 11:01 AM Amber wrote:  Pt is returning a missed call from someone in the office and is asking for a return call back soon. Thanks.     Reason for call:MISS CALL      Patient's DX:     Patient requesting call back or [facility] [phone]
